# Patient Record
Sex: FEMALE | Race: WHITE | Employment: UNEMPLOYED | URBAN - METROPOLITAN AREA
[De-identification: names, ages, dates, MRNs, and addresses within clinical notes are randomized per-mention and may not be internally consistent; named-entity substitution may affect disease eponyms.]

---

## 2017-01-16 ENCOUNTER — OFFICE VISIT (OUTPATIENT)
Dept: INTERNAL MEDICINE CLINIC | Age: 27
End: 2017-01-16

## 2017-01-16 VITALS
RESPIRATION RATE: 19 BRPM | WEIGHT: 220.4 LBS | HEIGHT: 66 IN | TEMPERATURE: 97.7 F | DIASTOLIC BLOOD PRESSURE: 81 MMHG | HEART RATE: 75 BPM | SYSTOLIC BLOOD PRESSURE: 121 MMHG | BODY MASS INDEX: 35.42 KG/M2 | OXYGEN SATURATION: 97 %

## 2017-01-16 DIAGNOSIS — M25.552 PAIN OF LEFT HIP JOINT: ICD-10-CM

## 2017-01-16 DIAGNOSIS — Z00.00 WELL ADULT EXAM: Primary | ICD-10-CM

## 2017-01-16 DIAGNOSIS — J30.1 SEASONAL ALLERGIC RHINITIS DUE TO POLLEN: ICD-10-CM

## 2017-01-16 RX ORDER — CETIRIZINE HCL 10 MG
10 TABLET ORAL DAILY
COMMUNITY
End: 2017-07-21 | Stop reason: SDUPTHER

## 2017-01-16 NOTE — PATIENT INSTRUCTIONS
Hip Pain: Care Instructions  Your Care Instructions  Hip pain may be caused by many things, including overuse, a fall, or a twisting movement. Another cause of hip pain is arthritis. Your pain may increase when you stand up, walk, or squat. The pain may come and go or may be constant. Home treatment can help relieve hip pain, swelling, and stiffness. If your pain is ongoing, you may need more tests and treatment. Follow-up care is a key part of your treatment and safety. Be sure to make and go to all appointments, and call your doctor if you are having problems. Its also a good idea to know your test results and keep a list of the medicines you take. How can you care for yourself at home? · Take pain medicines exactly as directed. ¨ If the doctor gave you a prescription medicine for pain, take it as prescribed. ¨ If you are not taking a prescription pain medicine, ask your doctor if you can take an over-the-counter medicine. · Rest and protect your hip. Take a break from any activity, including standing or walking, that may cause pain. · Put ice or a cold pack against your hip for 10 to 20 minutes at a time. Try to do this every 1 to 2 hours for the next 3 days (when you are awake) or until the swelling goes down. Put a thin cloth between the ice and your skin. · Sleep on your healthy side with a pillow between your knees, or sleep on your back with pillows under your knees. · If there is no swelling, you can put moist heat, a heating pad, or a warm cloth on your hip. Do gentle stretching exercises to help keep your hip flexible. · Learn how to prevent falls. Have your vision and hearing checked regularly. Wear slippers or shoes with a nonskid sole. · Stay at a healthy weight. · Wear comfortable shoes. When should you call for help? Call 911 anytime you think you may need emergency care. For example, call if:  · You have sudden chest pain and shortness of breath, or you cough up blood.   · You are not able to stand or walk or bear weight. · Your buttocks, legs, or feet feel numb or tingly. · Your leg or foot is cool or pale or changes color. · You have severe pain. Call your doctor now or seek immediate medical care if:  · You have signs of infection, such as:  ¨ Increased pain, swelling, warmth, or redness in the hip area. ¨ Red streaks leading from the hip area. ¨ Pus draining from the hip area. ¨ A fever. · You have signs of a blood clot, such as:  ¨ Pain in your calf, back of the knee, thigh, or groin. ¨ Redness and swelling in your leg or groin. · You are not able to bend, straighten, or move your leg normally. · You have trouble urinating or having bowel movements. Watch closely for changes in your health, and be sure to contact your doctor if:  · You do not get better as expected. Where can you learn more? Go to http://abena-saul.info/. Enter D261 in the search box to learn more about \"Hip Pain: Care Instructions. \"  Current as of: May 27, 2016  Content Version: 11.1  © 1524-9332 [employer]. Care instructions adapted under license by [employer] (which disclaims liability or warranty for this information). If you have questions about a medical condition or this instruction, always ask your healthcare professional. [place] [address] any warranty or liability for your use of this information.

## 2017-01-16 NOTE — MR AVS SNAPSHOT
Visit Information     Date & Time Provider Department Dept. Phone Encounter #    1/16/2017  3:00 PM Thompson Hanna MD [address] [address] Internal Medicine [phone] [id number]      Follow-up Instructions     Return in about 6 months (around 7/16/2017) for Follow up. Upcoming Health Maintenance        Date Due    PAP AKA CERVICAL CYTOLOGY 10/26/2019    DTaP/Tdap/Td series (2 - Td) 9/9/2021      Allergies as of 1/16/2017  Review Complete On: 1/16/2017 By: Thompson Hanna MD    No Known Allergies      Current Immunizations  Never Reviewed    No immunizations on file. Not reviewed this visit   You Were Diagnosed With        Codes Comments    Well adult exam    -  Primary ICD-10-CM: Z00.00  ICD-9-CM: V70.0     Seasonal allergic rhinitis due to pollen     ICD-10-CM: J30.1  ICD-9-CM: 477.0     Pain of left hip joint     ICD-10-CM: M25.552  ICD-9-CM: 719.45       Vitals     BP Pulse Temp Resp Height(growth percentile) Weight(growth percentile)    121/81 (BP 1 Location: Right arm, BP Patient Position: Sitting) 75 97.7 °F (36.5 °C) (Oral) 19 5' 6\" (1.676 m) 220 lb 6.4 oz (100 kg)    LMP SpO2 BMI OB Status Smoking Status       12/27/2016 97% 35.57 kg/m2 Having regular periods Never Smoker     Vitals History      BMI and BSA Data     Body Mass Index Body Surface Area    35.57 kg/m 2 2.16 m 2         Preferred Pharmacy       Pharmacy Name Phone    [facility] [address] [place] [address], [facility]. Jimi Quijano  [phone]         Your Updated Medication List          This list is accurate as of: 1/16/17  4:01 PM.  Always use your most recent med list.                CLARITIN PO   Take  by mouth. [facility] (28) 0.25-35 mg-mcg Tab   Generic drug:  norgestimate-ethinyl estradiol   Take 1 Tab by mouth daily. ZyrTEC 10 mg tablet   Generic drug:  cetirizine   Take  by mouth. Follow-up Instructions     Return in about 6 months (around 7/16/2017) for Follow up. Patient Instructions         Hip Pain: Care Instructions  Your Care Instructions  Hip pain may be caused by many things, including overuse, a fall, or a twisting movement. Another cause of hip pain is arthritis. Your pain may increase when you stand up, walk, or squat. The pain may come and go or may be constant. Home treatment can help relieve hip pain, swelling, and stiffness. If your pain is ongoing, you may need more tests and treatment. Follow-up care is a key part of your treatment and safety. Be sure to make and go to all appointments, and call your doctor if you are having problems. Its also a good idea to know your test results and keep a list of the medicines you take. How can you care for yourself at home? · Take pain medicines exactly as directed. ¨ If the doctor gave you a prescription medicine for pain, take it as prescribed. ¨ If you are not taking a prescription pain medicine, ask your doctor if you can take an over-the-counter medicine. · Rest and protect your hip. Take a break from any activity, including standing or walking, that may cause pain. · Put ice or a cold pack against your hip for 10 to 20 minutes at a time. Try to do this every 1 to 2 hours for the next 3 days (when you are awake) or until the swelling goes down. Put a thin cloth between the ice and your skin. · Sleep on your healthy side with a pillow between your knees, or sleep on your back with pillows under your knees. · If there is no swelling, you can put moist heat, a heating pad, or a warm cloth on your hip. Do gentle stretching exercises to help keep your hip flexible. · Learn how to prevent falls. Have your vision and hearing checked regularly. Wear slippers or shoes with a nonskid sole. · Stay at a healthy weight. · Wear comfortable shoes. When should you call for help? Call 911 anytime you think you may need emergency care.  For example, call if:  · You have sudden chest pain and shortness of breath, or you cough up blood. · You are not able to stand or walk or bear weight. · Your buttocks, legs, or feet feel numb or tingly. · Your leg or foot is cool or pale or changes color. · You have severe pain. Call your doctor now or seek immediate medical care if:  · You have signs of infection, such as:  ¨ Increased pain, swelling, warmth, or redness in the hip area. ¨ Red streaks leading from the hip area. ¨ Pus draining from the hip area. ¨ A fever. · You have signs of a blood clot, such as:  ¨ Pain in your calf, back of the knee, thigh, or groin. ¨ Redness and swelling in your leg or groin. · You are not able to bend, straighten, or move your leg normally. · You have trouble urinating or having bowel movements. Watch closely for changes in your health, and be sure to contact your doctor if:  · You do not get better as expected. Where can you learn more? Go to http://abena-saul.info/. Enter Q632 in the search box to learn more about \"Hip Pain: Care Instructions. \"  Current as of: May 27, 2016  Content Version: 11.1  © 2933-7353 [employer]. Care instructions adapted under license by [employer] (which disclaims liability or warranty for this information). If you have questions about a medical condition or this instruction, always ask your healthcare professional. [place] [address] any warranty or liability for your use of this information. Introducing [facility] & HEALTH SERVICES! Consuelo Vásquez introduces [employer] patient portal. Now you can access parts of your medical record, email your doctor's office, and request medication refills online. 1. In your internet browser, go to [email or web address]   2. Click on the First Time User? Click Here link in the Sign In box. You will see the New Member Sign Up page. 3. Enter your [employer] Access Code exactly as it appears below.  You will not need to use this code after youve completed the sign-up process. If you do not sign up before the expiration date, you must request a new code. · viblast Access Code: [email or web address]  Expires: 4/16/2017  4:01 PM    4. Enter the last four digits of your Social Security Number (xxxx) and Date of Birth (mm/dd/yyyy) as indicated and click Submit. You will be taken to the next sign-up page. 5. Create a viblast ID. This will be your viblast login ID and cannot be changed, so think of one that is secure and easy to remember. 6. Create a viblast password. You can change your password at any time. 7. Enter your Password Reset Question and Answer. This can be used at a later time if you forget your password. 8. Enter your e-mail address. You will receive e-mail notification when new information is available in [facility] 9. Click Sign Up. You can now view and download portions of your medical record. 10. Click the Download Summary menu link to download a portable copy of your medical information. If you have questions, please visit the Frequently Asked Questions section of the viblast website. Remember, viblast is NOT to be used for urgent needs. For medical emergencies, dial 911. Now available from your iPhone and Android! Please provide this summary of care documentation to your next provider. Your primary care clinician is listed as Brianna Morse. If you have any questions after today's visit, please call [phone].

## 2017-01-16 NOTE — PROGRESS NOTES
HISTORY OF PRESENT ILLNESS  Truong Oakley is a 31 y.o. female. HPI   The pt is her for a complete physical.    She reports trying lose weight. Working out, eating better. She has seen the Gynecologist--she had a normal examination  She does report some hip pain--she has been to a masseuse and has been stretching. She notes this is trying to get better. No other complaints today. Patient Active Problem List    Diagnosis Date Noted    Seasonal allergic rhinitis due to pollen 10/27/2016     Current Outpatient Prescriptions   Medication Sig Dispense Refill    cetirizine (ZYRTEC) 10 mg tablet Take  by mouth.  norgestimate-ethinyl estradiol (SPRINTEC, 28,) 0.25-35 mg-mcg tab Take 1 Tab by mouth daily.  LORATADINE (CLARITIN PO) Take  by mouth. No Known Allergies  No past medical history on file. Past Surgical History   Procedure Laterality Date    Hx gyn       DNC     Family History   Problem Relation Age of Onset    Diabetes Mother     Heart Disease Paternal Grandmother      Social History   Substance Use Topics    Smoking status: Never Smoker    Smokeless tobacco: Not on file    Alcohol use 0.6 oz/week     1 Glasses of wine per week      Comment: occassional          Review of Systems   Constitutional: Negative. Gastrointestinal: Negative. Musculoskeletal: Positive for joint pain. Visit Vitals    /81 (BP 1 Location: Right arm, BP Patient Position: Sitting)    Pulse 75    Temp 97.7 °F (36.5 °C) (Oral)    Resp 19    Ht 5' 6\" (1.676 m)    Wt 220 lb 6.4 oz (100 kg)    LMP 12/27/2016    SpO2 97%    BMI 35.57 kg/m2       Physical Exam   Constitutional: She appears well-developed and well-nourished. HENT:   Head: Normocephalic and atraumatic. Mouth/Throat: Oropharynx is clear and moist.   Eyes: Pupils are equal, round, and reactive to light. Neck: Neck supple. Cardiovascular: Normal rate, regular rhythm and normal heart sounds.     No murmur heard.  Pulmonary/Chest: Effort normal and breath sounds normal. No respiratory distress. Musculoskeletal: Normal range of motion. She exhibits no tenderness. Lymphadenopathy:     She has no cervical adenopathy. ASSESSMENT and [facility] was seen today for complete physical.    Diagnoses and all orders for this visit:    Well adult exam    Seasonal allergic rhinitis due to pollen    Pain of left hip joint       Follow-up Disposition:  Return in about 6 months (around 7/16/2017) for Follow up.

## 2017-07-21 ENCOUNTER — OFFICE VISIT (OUTPATIENT)
Dept: INTERNAL MEDICINE CLINIC | Age: 27
End: 2017-07-21

## 2017-07-21 VITALS
HEART RATE: 66 BPM | OXYGEN SATURATION: 99 % | HEIGHT: 66 IN | BODY MASS INDEX: 34.55 KG/M2 | RESPIRATION RATE: 18 BRPM | TEMPERATURE: 98.2 F | DIASTOLIC BLOOD PRESSURE: 68 MMHG | SYSTOLIC BLOOD PRESSURE: 103 MMHG | WEIGHT: 215 LBS

## 2017-07-21 DIAGNOSIS — M25.511 RIGHT SHOULDER PAIN, UNSPECIFIED CHRONICITY: Primary | ICD-10-CM

## 2017-07-21 DIAGNOSIS — J30.1 SEASONAL ALLERGIC RHINITIS DUE TO POLLEN: ICD-10-CM

## 2017-07-21 RX ORDER — CETIRIZINE HCL 10 MG
10 TABLET ORAL DAILY
Qty: 30 TAB | Refills: 3 | Status: SHIPPED | OUTPATIENT
Start: 2017-07-21 | End: 2019-02-07

## 2017-07-21 NOTE — MR AVS SNAPSHOT
Visit Information     Date & Time Provider Department Dept. Phone Encounter #    7/21/2017  9:00 AM [facility] Internal Medicine [phone] [id number]      Follow-up Instructions     Return in about 6 months (around 1/21/2018) for Full Physical - 30 minutes appointment. Upcoming Health Maintenance        Date Due    INFLUENZA AGE 9 TO ADULT 8/1/2017    PAP AKA CERVICAL CYTOLOGY 10/26/2019    DTaP/Tdap/Td series (2 - Td) 9/9/2021      Allergies as of 7/21/2017  Review Complete On: 7/21/2017 By: Skye Perez MD    No Known Allergies      Current Immunizations  Never Reviewed    No immunizations on file. Not reviewed this visit   You Were Diagnosed With        Codes Comments    Right shoulder pain, unspecified chronicity    -  Primary ICD-10-CM: M25.511  ICD-9-CM: 719.41     Seasonal allergic rhinitis due to pollen     ICD-10-CM: J30.1  ICD-9-CM: 477.0       Vitals     BP Pulse Temp Resp Height(growth percentile) Weight(growth percentile)    103/68 (BP 1 Location: Left arm, BP Patient Position: Sitting) 66 98.2 °F (36.8 °C) (Oral) 18 5' 6\" (1.676 m) 215 lb (97.5 kg)    LMP SpO2 BMI OB Status Smoking Status       07/10/2017 (Approximate) 99% 34.7 kg/m2 Having regular periods Never Smoker     Vitals History      BMI and BSA Data     Body Mass Index Body Surface Area    34.7 kg/m 2 2.13 m 2         Preferred Pharmacy       Pharmacy Name Phone    [facility] [address] [place] [address], [facility]. Jimi Quijano  [phone]         Your Updated Medication List          This list is accurate as of: 7/21/17  9:23 AM.  Always use your most recent med list.                cetirizine 10 mg tablet   Commonly known as:  ZyrTEC   Take 1 Tab by mouth daily. [facility] (28) 0.25-35 mg-mcg Tab   Generic drug:  norgestimate-ethinyl estradiol   Take 1 Tab by mouth daily.                Prescriptions Sent to Pharmacy        Refills    cetirizine (ZYRTEC) 10 mg tablet 3 Sig: Take 1 Tab by mouth daily. Class: Normal    Pharmacy: [facility]  #: [phone]    Route: Oral      We Performed the Following     REFERRAL TO PHYSICAL THERAPY [QFO86 Custom]       Follow-up Instructions     Return in about 6 months (around 1/21/2018) for Full Physical - 30 minutes appointment. Referral Information     Referral ID Referred By Referred To       [id number] Miranda Haas V Not Available         Visits Status Start Date End Date    1 New Request 7/21/17 7/21/18    If your referral has a status of pending review or denied, additional information will be sent to support the outcome of this decision. Introducing [facility] & HEALTH SERVICES! Marii Zavala introduces [employer] patient portal. Now you can access parts of your medical record, email your doctor's office, and request medication refills online. 1. In your internet browser, go to [email or web address] Party   2. Click on the First Time User? Click Here link in the Sign In box. You will see the New Member Sign Up page. 3. Enter your [employer] Access Code exactly as it appears below. You will not need to use this code after youve completed the sign-up process. If you do not sign up before the expiration date, you must request a new code. · [employer] Access Code: [email or web address]  Expires: 10/19/2017  8:49 AM    4. Enter the last four digits of your Social Security Number (xxxx) and Date of Birth (mm/dd/yyyy) as indicated and click Submit. You will be taken to the next sign-up page. 5. Create a Euclidt ID. This will be your [employer] login ID and cannot be changed, so think of one that is secure and easy to remember. 6. Create a Euclidt password. You can change your password at any time. 7. Enter your Password Reset Question and Answer. This can be used at a later time if you forget your password. 8. Enter your e-mail address.  You will receive e-mail notification when new information is available in [address] 9. Click Sign Up. You can now view and download portions of your medical record. 10. Click the Download Summary menu link to download a portable copy of your medical information. If you have questions, please visit the Frequently Asked Questions section of the [employer] website. Remember, [employer] is NOT to be used for urgent needs. For medical emergencies, dial 911. Now available from your iPhone and Android! Please provide this summary of care documentation to your next provider. Your primary care clinician is listed as Gatito Drake. If you have any questions after today's visit, please call [phone].
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
## 2017-08-16 ENCOUNTER — HOSPITAL ENCOUNTER (OUTPATIENT)
Dept: PHYSICAL THERAPY | Age: 27
Discharge: HOME | End: 2017-08-16
Payer: COMMERCIAL

## 2017-08-16 PROCEDURE — 97110 THERAPEUTIC EXERCISES: CPT | Performed by: PHYSICAL THERAPIST

## 2017-08-16 PROCEDURE — 97162 PT EVAL MOD COMPLEX 30 MIN: CPT | Performed by: PHYSICAL THERAPIST

## 2017-08-16 PROCEDURE — 97140 MANUAL THERAPY 1/> REGIONS: CPT | Performed by: PHYSICAL THERAPIST

## 2017-08-16 PROCEDURE — 97014 ELECTRIC STIMULATION THERAPY: CPT | Performed by: PHYSICAL THERAPIST

## 2017-08-16 NOTE — PROGRESS NOTES
PT INITIAL EVALUATION NOTE 2-15    Patient Name: Memo Coleman  Date:2017  : 1990  [x]  Patient  Verified  Payor: Joanie Macdonald / Plan: Natalie Travis PPO / Product Type: PPO /    In time:3:35 PM  Out time:4:30 PM  Total Treatment Time (min): 55  Visit #: 1     Treatment Area: Pain in right shoulder [M25.511]    SUBJECTIVE  Pain Level (0-10 scale): 5/10   At worst: 7/10, pain is increased with reaching over head, working  At best: 2/10, pain is decreased with heat, massage  Any medication changes, allergies to medications, adverse drug reactions, diagnosis change, or new procedure performed?: [] No    [x] Yes (see summary sheet for update)  Subjective:     Pt c/o neck and shoulder pain for at least the past year. Pain has progressively worsened. Pt has been managing pain with massage. Pt reports massage helps with pain but she still has constant pain. Pt does cardiac ultrasounds and reports \"I am constantly in a bend awkward position. \"  Pt denies pain going down her arm. \"Sometimes there is a tingling in the middle of my back. \"  Pt denies numbness. Pt has not had any imaging. 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  90  Shoulder IR   T10  T7            Cervical AROM:  Flexion 100% \"pulling pain\"  Extension 100% \"stiffness\"  Side Bend 100% B p! Rotation R 75 deg L 80 deg       UPPER QUARTER   MUSCLE STRENGTH  KEY       R  L  0 - No Contraction  C1, C2 Neck Flex 5 p!    1 - Trace   C3 Side Flex  5  5    2 - Poor   C4 Sh Elev  5  5    3 - Fair    C5 Deltoid/Biceps 5  5    4 - Good   C6 Wrist Ext  5  5    5 - Normal   C7 Triceps  5  5        C8 Thumb Ext  5  5        T1 Hand Inst  5  5      MMT: See above  Shoulder flexion R 4/5 p!  L 5/5  Shoulder ER 4+/5 B  Shoulder IR 4/5 B  Neurological: Sensations: WNL     Special Tests:   Painful Arc: - B   Full Can Test R+ L-   Apley Scratch Test: R= L -   Esquivel: R+ L-    Modality rationale: decrease pain and increase tissue extensibility to improve the patients ability to sit, reach, lift, carry, perform ADLs   Min Type Additional Details   15 [x] Estim: []Att   [x]Unatt        []TENS instruct                  []IFC  [x]Premod   []NMES                     []Other:  []w/US   []w/ice   [x]w/heat  Position: supine  Location: c-spine    []  Traction: [] Cervical       []Lumbar                       [] Prone          []Supine                       []Intermittent   []Continuous Lbs:  [] before manual  [] after manual  []w/heat    []  Ultrasound: []Continuous   [] Pulsed at:                            []1MHz   []3MHz Location:  W/cm2:    []  Paraffin         Location:  []w/heat    []  Ice     []  Heat  []  Ice massage Position:  Location:    []  Laser  []  Other: Position:  Location:    []  Vasopneumatic Device Pressure:       [] lo [] med [] hi   Temperature:    [x] Skin assessment post-treatment:  [x]intact []redness- no adverse reaction    []redness - adverse reaction:     10 min Therapeutic Exercise:  [x] See flow sheet :   Rationale: increase ROM and increase strength to improve the patients ability to sit, stand, transfer, ambulate, lift, carry, reach, complete ADLs    10 min Manual Therapy:  MFR to B UT,  B levator, Cervical distraction with suboccipital release, R SCM stretch   Rationale: decrease pain, increase ROM, increase tissue extensibility and decrease trigger points  to improve the patients ability to sit, stand, transfer, ambulate, lift, carry, reach, complete ADLs         With   [x] TE   [] TA   [] neuro   [] other: Patient Education: [x] Review HEP    [] Progressed/Changed HEP based on:   [x] positioning   [x] body mechanics   [] transfers   [x] heat/ice application    [] other:      Pain Level (0-10 scale) post treatment: 0      ASSESSMENT:      [x]  See Plan of [facility], PT 8/16/2017  3:29 PM

## 2017-08-16 NOTE — PROGRESS NOTES
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 Assessment/ key information: The patient presents with progressively worsening R shoulder pain exacerbated by work tasks. The patient condition is complicated by postural dysfunction and chronic nature of condition. Evaluation Complexity History MEDIUM  Complexity : 1-2 comorbidities / personal factors will impact the outcome/ POC ; Examination MEDIUM Complexity : 3 Standardized tests and measures addressing body structure, function, activity limitation and / or participation in recreation  ;Presentation MEDIUM Complexity : Evolving with changing characteristics  ; Clinical Decision Making MEDIUM Complexity : FOTO score of 26-74  Overall Complexity Rating: MEDIUM    Problem List: pain affecting function, decrease ROM, decrease strength, decrease ADL/ functional abilitiies and decrease flexibility/ joint mobility   Treatment Plan may include any combination of the following: Therapeutic exercise, Neuromuscular re-education, Gait/balance training, Manual therapy, Patient education and Functional mobility training  Patient / Family readiness to learn indicated by: asking questions, trying to perform skills and interest  Persons(s) to be included in education: patient (P)  Barriers to Learning/Limitations: None  Patient Goal (s): decrease my pain  Patient Self Reported Health Status: good  Rehabilitation Potential: excellent    Short Term Goals: To be accomplished in 4 weeks:  1) The patient will be independent with introductory HEP  2) The patient will demonstrate pain free cervical rotation AROM WFL to improve safety with driving  3) The patient will demonstrate R shoulder extension to 50 degrees to improve ease with dressing  Long Term Goals: To be accomplished in 12 weeks:  1) The patient will demonstrate pain free shoulder AROM WFL to improve ease with household chores  2) The patient will demonstrate ability to complete a full day of work without an increase in pain  3) The patient will demonstrate ability to resume fitness routine without an increase in pain  Frequency / Duration: Patient to be seen 1 times per week for 12 weeks. Patient/ Caregiver education and instruction: self care, activity modification and exercises    [x]  Plan of care has been reviewed with CALVIN Basilio, PT 8/16/2017 3:29 PM    ________________________________________________________________________    I certify that the above Therapy Services are being furnished while the patient is under my care. I agree with the treatment plan and certify that this therapy is necessary.     [de-identified] Signature:____________________  Date:____________Time: _________

## 2017-08-22 ENCOUNTER — HOSPITAL ENCOUNTER (OUTPATIENT)
Dept: PHYSICAL THERAPY | Age: 27
Discharge: HOME | End: 2017-08-22
Payer: COMMERCIAL

## 2017-08-22 PROCEDURE — 97140 MANUAL THERAPY 1/> REGIONS: CPT

## 2017-08-22 PROCEDURE — 97014 ELECTRIC STIMULATION THERAPY: CPT

## 2017-08-22 PROCEDURE — 97110 THERAPEUTIC EXERCISES: CPT

## 2017-08-22 NOTE — PROGRESS NOTES
PT DAILY TREATMENT NOTE 2-15    Patient Name: Angelique Hernandez  Date:2017  : 1990  [x]  Patient  Verified  Payor: [employer] Payer / Plan: Goldy Almazan PPO / Product Type: PPO /    In time:4:20p  Out time:5:20p   Total Treatment Time (min): 60  Visit #: 2     Treatment Area: Pain in right shoulder [M25.511]    SUBJECTIVE  Pain Level (0-10 scale): 5  Any medication changes, allergies to medications, adverse drug reactions, diagnosis change, or new procedure performed?: [x] No    [] Yes (see summary sheet for update)  Subjective functional status/changes:   [] No changes reported  Patient reports she shoulder is very sore and achy today. Patient states she has been doing her HEP and went to the gym yesterday where she was doing planks and push ups and has been more sore.     OBJECTIVE    Modality rationale: decrease pain and increase tissue extensibility to improve the patients ability to lift, reach, carry and complete ADL's   Min Type Additional Details   15 [x] Estim: []Att   [x]Unatt        []TENS instruct                  []IFC  [x]Premod   []NMES                     []Other:  []w/US   []w/ice   [x]w/heat  Position:supine  Location:R shoulder, neck    []  Traction: [] Cervical       []Lumbar                       [] Prone          []Supine                       []Intermittent   []Continuous Lbs:  [] before manual  [] after manual  []w/heat    []  Ultrasound: []Continuous   [] Pulsed at:                            []1MHz   []3MHz Location:  W/cm2:    []  Paraffin         Location:  []w/heat    []  Ice     []  Heat  []  Ice massage Position:  Location:    []  Laser  []  Other: Position:  Location:    []  Vasopneumatic Device Pressure:       [] lo [] med [] hi   Temperature:    [x] Skin assessment post-treatment:  [x]intact []redness- no adverse reaction    []redness - adverse reaction:     30 min Therapeutic Exercise:  [x] See flow sheet :   Rationale: increase ROM and increase strength to improve the patients ability to  lift, reach, carry and complete ADL's    15 min Manual Therapy:  MFR R UT, levator, shoulder stabilizers, cervical paraspinals, suboccipital release   Rationale: decrease pain, increase ROM, increase tissue extensibility and decrease trigger points  to improve the patients ability to  lift, reach, carry and complete ADL's    With   [] TE   [] TA   [] neuro   [] other: Patient Education: [x] Review HEP    [] Progressed/Changed HEP based on:   [] positioning   [] body mechanics   [] transfers   [] heat/ice application    [] other:      Other Objective/Functional Measures: tissue turgor R UT, levator improved after manual     Pain Level (0-10 scale) post treatment: 0    ASSESSMENT/Changes in Function:     Patient will continue to benefit from skilled PT services to modify and progress therapeutic interventions, address functional mobility deficits, address ROM deficits, address strength deficits, analyze and address soft tissue restrictions, analyze and cue movement patterns, analyze and modify body mechanics/ergonomics and assess and modify postural abnormalities to attain remaining goals. []  See Plan of Care  []  See progress note/recertification  []  See Discharge Summary         Progress towards goals / Updated goals:  Patient able to advance several exercises with no increased pain. Patient educated on modified planks and push ups to prevent increased pain. Patient requires verbal cues for postural awareness and mechanics for exercises.      PLAN  [x]  Upgrade activities as tolerated     [x]  Continue plan of care  []  Update interventions per flow sheet       []  Discharge due to:_  []  Other:_      Helga Mensah PTA 8/22/2017  4:25 PM

## 2017-08-24 ENCOUNTER — OFFICE VISIT (OUTPATIENT)
Dept: INTERNAL MEDICINE CLINIC | Age: 27
End: 2017-08-24

## 2017-08-24 VITALS
SYSTOLIC BLOOD PRESSURE: 112 MMHG | BODY MASS INDEX: 35.93 KG/M2 | OXYGEN SATURATION: 99 % | HEART RATE: 69 BPM | WEIGHT: 223.6 LBS | RESPIRATION RATE: 16 BRPM | HEIGHT: 66 IN | TEMPERATURE: 97.9 F | DIASTOLIC BLOOD PRESSURE: 72 MMHG

## 2017-08-24 DIAGNOSIS — J20.9 ACUTE BRONCHITIS, UNSPECIFIED ORGANISM: ICD-10-CM

## 2017-08-24 DIAGNOSIS — J01.00 ACUTE NON-RECURRENT MAXILLARY SINUSITIS: Primary | ICD-10-CM

## 2017-08-24 DIAGNOSIS — J30.89 NON-SEASONAL ALLERGIC RHINITIS DUE TO OTHER ALLERGIC TRIGGER: ICD-10-CM

## 2017-08-24 RX ORDER — AMOXICILLIN AND CLAVULANATE POTASSIUM 875; 125 MG/1; MG/1
1 TABLET, FILM COATED ORAL 2 TIMES DAILY
Qty: 20 TAB | Refills: 0 | Status: SHIPPED | OUTPATIENT
Start: 2017-08-24 | End: 2017-09-03

## 2017-08-24 NOTE — PROGRESS NOTES
Chief Complaint   Patient presents with    Allergies     1. Have you been to the ER, urgent care clinic since your last visit? Hospitalized since your last visit? No    2. Have you seen or consulted any other health care providers outside of the [address] [place] [address] since your last visit? Include any pap smears or colon screening.  No

## 2017-08-24 NOTE — MR AVS SNAPSHOT
Visit Information     Date & Time Provider Department Dept. Phone Encounter #    8/24/2017  4:15 PM Enrico Opitz, [address] Internal Medicine [phone] [id number]      Follow-up Instructions     Return if symptoms worsen or fail to improve. Upcoming Health Maintenance        Date Due    INFLUENZA AGE 9 TO ADULT 8/1/2017    PAP AKA CERVICAL CYTOLOGY 10/26/2019    DTaP/Tdap/Td series (2 - Td) 9/9/2021      Allergies as of 8/24/2017  Review Complete On: 8/24/2017 By: Enrico Opitz, MD    No Known Allergies      Current Immunizations  Never Reviewed    No immunizations on file. Not reviewed this visit   You Were Diagnosed With        Codes Comments    Acute non-recurrent maxillary sinusitis    -  Primary ICD-10-CM: J01.00  ICD-9-CM: 461.0     Acute bronchitis, unspecified organism     ICD-10-CM: J20.9  ICD-9-CM: 466.0     Non-seasonal allergic rhinitis due to other allergic trigger     ICD-10-CM: J30.89  ICD-9-CM: 477.8       Vitals     BP Pulse Temp Resp Height(growth percentile) Weight(growth percentile)    112/72 69 97.9 °F (36.6 °C) (Oral) 16 5' 6\" (1.676 m) 223 lb 9.6 oz (101.4 kg)    LMP SpO2 BMI OB Status Smoking Status       08/10/2017 99% 36.09 kg/m2 Having regular periods Never Smoker       BMI and BSA Data     Body Mass Index Body Surface Area    36.09 kg/m 2 2.17 m 2         Preferred Pharmacy       Pharmacy Name Phone    [facility] [address] [place] [address], [facility]. Jimi Quijano [address] [phone]         Your Updated Medication List          This list is accurate as of: 8/24/17  4:50 PM.  Always use your most recent med list.                amoxicillin-clavulanate 875-125 mg per tablet   Commonly known as:  AUGMENTIN   Take 1 Tab by mouth two (2) times a day for 10 days. cetirizine 10 mg tablet   Commonly known as:  ZyrTEC   Take 1 Tab by mouth daily.        [facility] () 0.25-35 mg-mcg Tab   Generic drug:  norgestimate-ethinyl estradiol   Take 1 Tab by mouth daily.               Prescriptions Sent to Pharmacy        Refills    amoxicillin-clavulanate (AUGMENTIN) 875-125 mg per tablet 0    Sig: Take 1 Tab by mouth two (2) times a day for 10 days. Class: Normal    Pharmacy: [facility]  #: [phone]    Route: Oral      Follow-up Instructions     Return if symptoms worsen or fail to improve. To-Do List     08/29/2017 4:30 PM     Appointment with Danya Topete at [facility]  [address] ([phone])       09/05/2017 4:30 PM     Appointment with Danya Topete at [facility]  [address] ([phone])       09/11/2017 4:30 PM     Appointment with [address] [place] [address] at [facility]  [address] ([phone])       09/19/2017 4:30 PM     Appointment with Danya Topete at [facility]  [address] ([phone])         Introducing [facility] SERVICES! [employer] introduces [employer] patient portal. Now you can access parts of your medical record, email your doctor's office, and request medication refills online. 1. In your internet browser, go to [email or web address]   2. Click on the First Time User? Click Here link in the Sign In box. You will see the New Member Sign Up page. 3. Enter your [employer] Access Code exactly as it appears below. You will not need to use this code after youve completed the sign-up process. If you do not sign up before the expiration date, you must request a new code. · [employer] Access Code: [email or web address]  Expires: 10/19/2017  8:49 AM    4. Enter the last four digits of your Social Security Number (xxxx) and Date of Birth (mm/dd/yyyy) as indicated and click Submit. You will be taken to the next sign-up page. 5. Create a [employer] ID. This will be your [employer] login ID and cannot be changed, so think of one that is secure and easy to remember. 6. Create a [employer] password. You can change your password at any time. 7. Enter your Password Reset Question and Answer. This can be used at a later time if you forget your password. 8. Enter your e-mail address. You will receive e-mail notification when new information is available in [address] 9. Click Sign Up. You can now view and download portions of your medical record. 10. Click the Download Summary menu link to download a portable copy of your medical information. If you have questions, please visit the Frequently Asked Questions section of the [employer] website. Remember, [employer] is NOT to be used for urgent needs. For medical emergencies, dial 911. Now available from your iPhone and Android! Please provide this summary of care documentation to your next provider. Your primary care clinician is listed as Derik Larose. If you have any questions after today's visit, please call [phone].

## 2017-08-24 NOTE — PROGRESS NOTES
Subjective:   Carolyne Greenfield is a 28 y.o. female who complains of congestion, sneezing, sore throat, productive cough, cough described as productive of green sputum, bilateral sinus pain, bilateral ear fullness, popping and green nasal discharge for 7 days, gradually worsening since that time. Occasional wheezing. She denies a history of fevers, nausea, shortness of breath. Evaluation to date: none. Treatment to date: antihistamines, nasal steroids. Patient does not smoke cigarettes. Relevant PMH: No pertinent additional PMH. Current Outpatient Prescriptions   Medication Sig Dispense Refill    cetirizine (ZYRTEC) 10 mg tablet Take 1 Tab by mouth daily. 30 Tab 3    norgestimate-ethinyl estradiol (SPRINTEC, 28,) 0.25-35 mg-mcg tab Take 1 Tab by mouth daily. No Known Allergies     Review of Systems  Pertinent items are noted in HPI. Objective:     Visit Vitals    /72    Pulse 69    Temp 97.9 °F (36.6 °C) (Oral)    Resp 16    Ht 5' 6\" (1.676 m)    Wt 223 lb 9.6 oz (101.4 kg)    LMP 08/10/2017    SpO2 99%    BMI 36.09 kg/m2     General:  alert, cooperative, no distress   Eyes: negative   Ears: Left TM small amount of clear fluid, otherwise normal TM and external canal.    Sinuses: Nasal congestion, sinus nontender   Mouth:  normal findings: oropharynx pink & moist without lesions or evidence of thrush   Neck: supple, symmetrical, trachea midline and no adenopathy. Heart: S1 and S2 normal.    Lungs: clear to auscultation bilaterally   Abdomen: soft, non-tender. Bowel sounds normal. No masses,  no organomegaly        Assessment/Plan:     Diagnoses and all orders for this visit:    1. Acute non-recurrent maxillary sinusitis  2. Acute bronchitis, unspecified organism  3. Non-seasonal allergic rhinitis due to other allergic trigger  Plan:   Other orders  -     amoxicillin-clavulanate (AUGMENTIN) 875-125 mg per tablet; Take 1 Tab by mouth two (2) times a day for 10 days. Probiotic. Use additional birth control while on antibiotic. Continue with current allergy medications. .I have discussed the diagnosis with the patient and the intended plan as seen in the above orders. The patient has received an after-visit summary and questions were answered concerning future plans. I have discussed medication side effects and warnings with the patient as well. She has expressed understanding of the diagnosis and plan    Follow-up Disposition:  Return if symptoms worsen or fail to improve, for follow up.

## 2017-08-29 ENCOUNTER — HOSPITAL ENCOUNTER (OUTPATIENT)
Dept: PHYSICAL THERAPY | Age: 27
Discharge: HOME | End: 2017-08-29
Payer: COMMERCIAL

## 2017-08-29 PROCEDURE — 97110 THERAPEUTIC EXERCISES: CPT

## 2017-08-29 PROCEDURE — 97140 MANUAL THERAPY 1/> REGIONS: CPT

## 2017-08-29 PROCEDURE — 97014 ELECTRIC STIMULATION THERAPY: CPT

## 2017-08-29 NOTE — PROGRESS NOTES
PT DAILY TREATMENT NOTE 2-15    Patient Name: Bhupinder Perales  Date:2017  : 1990  [x]  Patient  Verified  Payor: Genet Jiménezing / Plan: Dominic Mcmahan PPO / Product Type: PPO /    In time:4:30p  Out time: 5:30p   Total Treatment Time (min): 60  Visit #: 3     Treatment Area: Pain in right shoulder [M25.511]    SUBJECTIVE  Pain Level (0-10 scale): 5  Any medication changes, allergies to medications, adverse drug reactions, diagnosis change, or new procedure performed?: [x] No    [] Yes (see summary sheet for update)  Subjective functional status/changes:   [] No changes reported  Patient reports she was feeling good until this weekend when she was extremely busy at work.     OBJECTIVE    Modality rationale: decrease pain and increase tissue extensibility to improve the patients ability to lift, reach, carry and complete ADL's   Min Type Additional Details   15 [x] Estim: []Att   [x]Unatt        []TENS instruct                  []IFC  [x]Premod   []NMES                     []Other:  []w/US   []w/ice   [x]w/heat  Position:supine  Location:R shoulder, neck    []  Traction: [] Cervical       []Lumbar                       [] Prone          []Supine                       []Intermittent   []Continuous Lbs:  [] before manual  [] after manual  []w/heat    []  Ultrasound: []Continuous   [] Pulsed at:                            []1MHz   []3MHz Location:  W/cm2:    []  Paraffin         Location:  []w/heat    []  Ice     []  Heat  []  Ice massage Position:  Location:    []  Laser  []  Other: Position:  Location:    []  Vasopneumatic Device Pressure:       [] lo [] med [] hi   Temperature:    [x] Skin assessment post-treatment:  [x]intact []redness- no adverse reaction    []redness - adverse reaction:     30 min Therapeutic Exercise:  [x] See flow sheet :   Rationale: increase ROM and increase strength to improve the patients ability to  lift, reach, carry and complete ADL's    15 min Manual Therapy:  MFR R UT, levator, shoulder stabilizers, cervical paraspinals, suboccipital release   Rationale: decrease pain, increase ROM, increase tissue extensibility and decrease trigger points  to improve the patients ability to  lift, reach, carry and complete ADL's    With   [] TE   [] TA   [] neuro   [] other: Patient Education: [x] Review HEP    [] Progressed/Changed HEP based on:   [] positioning   [] body mechanics   [] transfers   [] heat/ice application    [] other:      Other Objective/Functional Measures:      Pain Level (0-10 scale) post treatment: 0    ASSESSMENT/Changes in Function:     Patient will continue to benefit from skilled PT services to modify and progress therapeutic interventions, address functional mobility deficits, address ROM deficits, address strength deficits, analyze and address soft tissue restrictions, analyze and cue movement patterns, analyze and modify body mechanics/ergonomics and assess and modify postural abnormalities to attain remaining goals. []  See Plan of Care  []  See progress note/recertification  []  See Discharge Summary         Progress towards goals / Updated goals:  Patient able to tolerate all exercises and advance several with no increased pain and reports increased fatigue.      PLAN  [x]  Upgrade activities as tolerated     [x]  Continue plan of care  []  Update interventions per flow sheet       []  Discharge due to:_  []  Other:_      Filiberto Marquez PTA 8/29/2017  4:25 PM

## 2017-09-05 ENCOUNTER — HOSPITAL ENCOUNTER (OUTPATIENT)
Dept: PHYSICAL THERAPY | Age: 27
Discharge: HOME | End: 2017-09-05
Payer: COMMERCIAL

## 2017-09-05 PROCEDURE — 97014 ELECTRIC STIMULATION THERAPY: CPT

## 2017-09-05 PROCEDURE — 97140 MANUAL THERAPY 1/> REGIONS: CPT

## 2017-09-05 PROCEDURE — 97110 THERAPEUTIC EXERCISES: CPT

## 2017-09-05 NOTE — PROGRESS NOTES
PT DAILY TREATMENT NOTE 2-15    Patient Name: Angelique Hernandez  Date:2017  : 1990  [x]  Patient  Verified  Payor: [employer] Payer / Plan: Goldy Almazan PPO / Product Type: PPO /    In time:4:30p  Out time: 5:30p  Total Treatment Time (min): 60  Visit #: 4     Treatment Area: Pain in right shoulder [M25.511]    SUBJECTIVE  Pain Level (0-10 scale): 6-7  Any medication changes, allergies to medications, adverse drug reactions, diagnosis change, or new procedure performed?: [x] No    [] Yes (see summary sheet for update)  Subjective functional status/changes:   [] No changes reported  Patient reports she has been getting some numbness in her first three fingers with doorway stretch.     OBJECTIVE    Modality rationale: decrease pain and increase tissue extensibility to improve the patients ability to lift, reach, carry and complete ADL's   Min Type Additional Details   15 [x] Estim: []Att   [x]Unatt        []TENS instruct                  []IFC  [x]Premod   []NMES                     []Other:  []w/US   []w/ice   [x]w/heat  Position:supine  Location:R shoulder, neck    []  Traction: [] Cervical       []Lumbar                       [] Prone          []Supine                       []Intermittent   []Continuous Lbs:  [] before manual  [] after manual  []w/heat    []  Ultrasound: []Continuous   [] Pulsed at:                            []1MHz   []3MHz Location:  W/cm2:    []  Paraffin         Location:  []w/heat    []  Ice     []  Heat  []  Ice massage Position:  Location:    []  Laser  []  Other: Position:  Location:    []  Vasopneumatic Device Pressure:       [] lo [] med [] hi   Temperature:    [x] Skin assessment post-treatment:  [x]intact []redness- no adverse reaction    []redness - adverse reaction:     30 min Therapeutic Exercise:  [x] See flow sheet :   Rationale: increase ROM and increase strength to improve the patients ability to  lift, reach, carry and complete ADL's    15 min Manual Therapy:  SEE BULL, levator, shoulder stabilizers, cervical paraspinals, suboccipital release   Rationale: decrease pain, increase ROM, increase tissue extensibility and decrease trigger points  to improve the patients ability to  lift, reach, carry and complete ADL's    With   [] TE   [] TA   [] neuro   [] other: Patient Education: [x] Review HEP    [] Progressed/Changed HEP based on:   [] positioning   [] body mechanics   [] transfers   [] heat/ice application    [] other:      Other Objective/Functional Measures:      Pain Level (0-10 scale) post treatment: 0    ASSESSMENT/Changes in Function:     Patient will continue to benefit from skilled PT services to modify and progress therapeutic interventions, address functional mobility deficits, address ROM deficits, address strength deficits, analyze and address soft tissue restrictions, analyze and cue movement patterns, analyze and modify body mechanics/ergonomics and assess and modify postural abnormalities to attain remaining goals. []  See Plan of Care  []  See progress note/recertification  []  See Discharge Summary         Progress towards goals / Updated goals:  Patient able to tolerate all exercises and advance several with no increased pain. Patient requires verbal cues for postural awareness and mechanics and is making steady progress towards goals.     PLAN  [x]  Upgrade activities as tolerated     [x]  Continue plan of care  []  Update interventions per flow sheet       []  Discharge due to:_  []  Other:_      [facility] 9/5/2017  4:25 PM

## 2017-09-11 ENCOUNTER — HOSPITAL ENCOUNTER (OUTPATIENT)
Dept: PHYSICAL THERAPY | Age: 27
Discharge: HOME | End: 2017-09-11
Payer: COMMERCIAL

## 2017-09-11 PROCEDURE — 97014 ELECTRIC STIMULATION THERAPY: CPT | Performed by: PHYSICAL THERAPIST

## 2017-09-11 PROCEDURE — 97110 THERAPEUTIC EXERCISES: CPT | Performed by: PHYSICAL THERAPIST

## 2017-09-11 PROCEDURE — 97140 MANUAL THERAPY 1/> REGIONS: CPT | Performed by: PHYSICAL THERAPIST

## 2017-09-11 NOTE — PROGRESS NOTES
PT DAILY TREATMENT NOTE 2-15    Patient Name: Hilary Murillo  Date:2017  : 1990  [x]  Patient  Verified  Payor: Danni Aquino / Plan: Noemi Thomas PPO / Product Type: PPO /    In time:4:30p  Out time: 5:30p  Total Treatment Time (min): 60  Visit #: 5    Treatment Area: Pain in right shoulder [M25.511]    SUBJECTIVE  Pain Level (0-10 scale): 4-5 L shoulder  Any medication changes, allergies to medications, adverse drug reactions, diagnosis change, or new procedure performed?: [x] No    [] Yes (see summary sheet for update)  Subjective functional status/changes:   [] No changes reported  Patient reports she doesn't know why but her L shoulder is hurting today    OBJECTIVE    Modality rationale: decrease pain and increase tissue extensibility to improve the patients ability to lift, reach, carry and complete ADL's   Min Type Additional Details   15 [x] Estim: []Att   [x]Unatt        []TENS instruct                  []IFC  [x]Premod   []NMES                     []Other:  []w/US   []w/ice   [x]w/heat  Position:supine  Location:R shoulder, neck    []  Traction: [] Cervical       []Lumbar                       [] Prone          []Supine                       []Intermittent   []Continuous Lbs:  [] before manual  [] after manual  []w/heat    []  Ultrasound: []Continuous   [] Pulsed at:                            []1MHz   []3MHz Location:  W/cm2:    []  Paraffin         Location:  []w/heat    []  Ice     []  Heat  []  Ice massage Position:  Location:    []  Laser  []  Other: Position:  Location:    []  Vasopneumatic Device Pressure:       [] lo [] med [] hi   Temperature:    [x] Skin assessment post-treatment:  [x]intact []redness- no adverse reaction    []redness - adverse reaction:     30 min Therapeutic Exercise:  [x] See flow sheet :   Rationale: increase ROM and increase strength to improve the patients ability to  lift, reach, carry and complete ADL's    15 min Manual Therapy:  MFR R UT, levator, shoulder stabilizers, cervical paraspinals, suboccipital release   Rationale: decrease pain, increase ROM, increase tissue extensibility and decrease trigger points  to improve the patients ability to  lift, reach, carry and complete ADL's    With   [x] TE   [] TA   [] neuro   [] other: Patient Education: [x] Review HEP    [] Progressed/Changed HEP based on:   [x] positioning   [x] body mechanics   [] transfers   [x] heat/ice application    [] other:      Other Objective/Functional Measures:    No pain with therapeutic exercise this visit     Pain Level (0-10 scale) post treatment: 0    ASSESSMENT/Changes in Function:     Patient will continue to benefit from skilled PT services to modify and progress therapeutic interventions, address functional mobility deficits, address ROM deficits, address strength deficits, analyze and address soft tissue restrictions, analyze and cue movement patterns, analyze and modify body mechanics/ergonomics and assess and modify postural abnormalities to attain remaining goals. []  See Plan of Care  []  See progress note/recertification  []  See Discharge Summary         Progress towards goals / Updated goals:  Patient able to tolerate all exercises and advance several with no increased pain. Patient requires verbal cues for postural awareness and mechanics and is making steady progress towards goals.     PLAN  [x]  Upgrade activities as tolerated     [x]  Continue plan of care  []  Update interventions per flow sheet       []  Discharge due to:_  []  Other:_      Anders Childs PT, DPT 9/11/2017  4:30 PM

## 2017-09-19 ENCOUNTER — APPOINTMENT (OUTPATIENT)
Dept: PHYSICAL THERAPY | Age: 27
End: 2017-09-19
Payer: COMMERCIAL

## 2017-09-20 ENCOUNTER — HOSPITAL ENCOUNTER (OUTPATIENT)
Dept: PHYSICAL THERAPY | Age: 27
Discharge: HOME | End: 2017-09-20
Payer: COMMERCIAL

## 2017-09-20 PROCEDURE — 97140 MANUAL THERAPY 1/> REGIONS: CPT | Performed by: PHYSICAL THERAPIST

## 2017-09-20 PROCEDURE — 97110 THERAPEUTIC EXERCISES: CPT | Performed by: PHYSICAL THERAPIST

## 2017-09-20 PROCEDURE — 97530 THERAPEUTIC ACTIVITIES: CPT | Performed by: PHYSICAL THERAPIST

## 2017-09-20 NOTE — PROGRESS NOTES
PT DAILY TREATMENT NOTE 2-15    Patient Name: Tha Cruz  Date:2017  : 1990  [x]  Patient  Verified  Payor: Denise Anderson / Plan: Elizabet Wen PPO / Product Type: PPO /    In time:5:00  Out time:600  Total Treatment Time (min): 60  Visit #: 6     Treatment Area: Pain in right shoulder [M25.511]    SUBJECTIVE  Pain Level (0-10 scale): 6/10  Any medication changes, allergies to medications, adverse drug reactions, diagnosis change, or new procedure performed?: [x] No    [] Yes (see summary sheet for update)  Subjective functional status/changes:   [] No changes reported  Patient reports that she has not been good with her exercises this week and she can tell the difference because her shoulder really hurts    OBJECTIVE        30 min Therapeutic Exercise:  [x] See flow sheet :   Rationale: increase ROM and increase strength to improve the patients ability to tolerate sitting and holding devices needed for her job     15 min Therapeutic Activity:  [x]  See flow sheet :   Rationale: increase ROM, increase strength and increase proprioception  to improve the patients ability to perform all lift/carry/reach ADL's         15 min Manual Therapy:  Sub occipital release, Upper trap MFR and TPR with manual stretch and SCS to inhibit overactivity.   Cervical upslides and downglides to improve ROM   Rationale: decrease pain, increase ROM and increase tissue extensibility  to improve the patients ability to hold ultrasound at work without pain      With   [x] TE   [x] TA   [] neuro   [] other: Patient Education: [x] Review HEP    [x] Progressed/Changed HEP based on:   [] positioning   [x] body mechanics   [] transfers   [] heat/ice application    [] other:      Other Objective/Functional Measures: Patient performed PRE's with improved form with mirror feedback     Pain Level (0-10 scale) post treatment: 0/3    ASSESSMENT/Changes in Function:     Patient tolerated manual treatment well and demonstrates improved active cervical and GH ROM with less upper trap substitution. Patient will continue to benefit from skilled PT services to modify and progress therapeutic interventions, address functional mobility deficits, address ROM deficits, address strength deficits, analyze and cue movement patterns and analyze and modify body mechanics/ergonomics to attain remaining goals.      []  See Plan of Care  []  See progress note/recertification  []  See Discharge Summary             PLAN  [x]  Upgrade activities as tolerated     [x]  Continue plan of care  []  Update interventions per flow sheet       []  Discharge due to:_  []  Other:_      Mj Ring, PT, DPT 9/20/2017  5:01 PM

## 2017-11-01 NOTE — PROGRESS NOTES
[address] [place] [address], [address]  Phone: [phone]  Fax: [phone]    Discharge Summary  2-15    Patient name: Ginette Biggs  : 1990  Provider#: [id number]  Referral source: Mirlande Sewell MD      Medical/Treatment Diagnosis: Pain in right shoulder [M25.511]     Prior Hospitalization: see medical history     Comorbidities: See Plan of Care  Prior Level of Function:See Plan of Care  Medications: Verified on Patient Summary List    Start of Care: 17      Onset Date: 1 year ago   Visits from Start of Care: 6     Missed Visits: 0  Reporting Period : 17 to 17      ASSESSMENT/SUMMARY OF CARE: The patient has not been seen since 17.       RECOMMENDATIONS:  [x]Discontinue therapy: []Patient has reached or is progressing toward set goals      [x]Patient is non-compliant or has abdicated      []Due to lack of appreciable progress towards set goals      []Other    Gm Rodriguez, PT 2017 3:04 PM

## 2017-12-11 ENCOUNTER — HOSPITAL ENCOUNTER (EMERGENCY)
Age: 27
Discharge: HOME | End: 2017-12-11
Attending: FAMILY MEDICINE

## 2017-12-11 VITALS
HEIGHT: 66 IN | DIASTOLIC BLOOD PRESSURE: 81 MMHG | SYSTOLIC BLOOD PRESSURE: 136 MMHG | HEART RATE: 69 BPM | WEIGHT: 230 LBS | BODY MASS INDEX: 36.96 KG/M2 | OXYGEN SATURATION: 98 % | RESPIRATION RATE: 20 BRPM | TEMPERATURE: 98.1 F

## 2017-12-11 DIAGNOSIS — J01.00 ACUTE NON-RECURRENT MAXILLARY SINUSITIS: Primary | ICD-10-CM

## 2017-12-11 DIAGNOSIS — H92.02 EAR PAIN, LEFT: ICD-10-CM

## 2017-12-11 RX ORDER — DEXAMETHASONE SODIUM PHOSPHATE 100 MG/10ML
10 INJECTION INTRAMUSCULAR; INTRAVENOUS
Status: COMPLETED | OUTPATIENT
Start: 2017-12-11 | End: 2017-12-11

## 2017-12-11 RX ORDER — AMOXICILLIN 875 MG/1
875 TABLET, FILM COATED ORAL 2 TIMES DAILY
Qty: 20 TAB | Refills: 0 | Status: SHIPPED | OUTPATIENT
Start: 2017-12-11 | End: 2017-12-21

## 2017-12-11 RX ADMIN — DEXAMETHASONE SODIUM PHOSPHATE 10 MG: 100 INJECTION INTRAMUSCULAR; INTRAVENOUS at 13:00

## 2017-12-11 NOTE — UC PROVIDER NOTE
Patient is a 28 y.o. female presenting with ear pain. The history is provided by the patient (left ear fullness for about 2 weeks and then sinus pressure and congestion for about 3 days. thick green discharge). Ear Pain    This is a new problem. The problem occurs constantly. The problem has not changed since onset. Patient complains that the left ear is affected. There has been no fever. The pain is at a severity of 0/10. The patient is experiencing no pain. Pertinent negatives include no ear discharge, no rhinorrhea, no sore throat, no abdominal pain, no neck pain, no cough and no rash. Associated symptoms comments: Some sinus pain and congestion for about 3 days and making her left ear congestion worse. History reviewed. No pertinent past medical history. Past Surgical History:   Procedure Laterality Date    HX GYN      DNC         Family History   Problem Relation Age of Onset    Diabetes Mother     Heart Disease Paternal Grandmother         Social History     Social History    Marital status: UNKNOWN     Spouse name: N/A    Number of children: N/A    Years of education: N/A     Occupational History    Not on file. Social History Main Topics    Smoking status: Never Smoker    Smokeless tobacco: Never Used    Alcohol use 0.6 oz/week     1 Glasses of wine per week      Comment: occassional    Drug use: No    Sexual activity: Yes     Partners: Male     Birth control/ protection: Pill     Other Topics Concern    Not on file     Social History Narrative                ALLERGIES: Review of patient's allergies indicates no known allergies. Review of Systems   Constitutional: Negative. HENT: Negative for ear discharge, ear pain (left ear fullness like water in the ear), rhinorrhea and sore throat. Respiratory: Negative. Negative for cough. Gastrointestinal: Negative for abdominal pain. Musculoskeletal: Negative for neck pain. Skin: Negative for rash.    Neurological: Negative. Vitals:    12/11/17 1251   BP: 136/81   Pulse: 69   Resp: 20   Temp: 98.1 °F (36.7 °C)   SpO2: 98%   Weight: 104.3 kg (230 lb)   Height: 5' 6\" (1.676 m)       Physical Exam   Constitutional: She is oriented to person, place, and time. HENT:   Head: Normocephalic and atraumatic. Mouth/Throat: Oropharynx is clear and moist.   Nasal congestion, left maxillary sinus pain, PND, BL fluid behind the TM's without erythema, left TM slightly dull and retracted with cone of light interrupted     Eyes: Conjunctivae are normal.   Neck: Normal range of motion. Neck supple. Cardiovascular: Normal rate, regular rhythm and normal heart sounds. Pulmonary/Chest: Effort normal and breath sounds normal. No respiratory distress. She has no wheezes. She has no rales. She exhibits no tenderness. Lymphadenopathy:     She has no cervical adenopathy. Neurological: She is alert and oriented to person, place, and time. Skin: Skin is warm and dry. Psychiatric: She has a normal mood and affect. Her behavior is normal. Thought content normal.   Nursing note and vitals reviewed. MDM     Differential Diagnosis; Clinical Impression; Plan:     CLINICAL IMPRESSION:  Acute non-recurrent maxillary sinusitis  (primary encounter diagnosis)  Ear pain, left    Plan:  1. amox  2. Decadron x 1 and topical nasal steroids  3. Supportive care  Pregnancy test offered and pt declined    Risk of Significant Complications, Morbidity, and/or Mortality:   Presenting problems: Moderate  Management options:   Moderate  Progress:   Patient progress:  Stable      Procedures

## 2017-12-11 NOTE — DISCHARGE INSTRUCTIONS
Sinusitis: Care Instructions  Your Care Instructions    Sinusitis is an infection of the lining of the sinus cavities in your head. Sinusitis often follows a cold. It causes pain and pressure in your head and face. In most cases, sinusitis gets better on its own in 1 to 2 weeks. But some mild symptoms may last for several weeks. Sometimes antibiotics are needed. Follow-up care is a key part of your treatment and safety. Be sure to make and go to all appointments, and call your doctor if you are having problems. It's also a good idea to know your test results and keep a list of the medicines you take. How can you care for yourself at home? · Take an over-the-counter pain medicine, such as acetaminophen (Tylenol), ibuprofen (Advil, Motrin), or naproxen (Aleve). Read and follow all instructions on the label. · If the doctor prescribed antibiotics, take them as directed. Do not stop taking them just because you feel better. You need to take the full course of antibiotics. · Be careful when taking over-the-counter cold or flu medicines and Tylenol at the same time. Many of these medicines have acetaminophen, which is Tylenol. Read the labels to make sure that you are not taking more than the recommended dose. Too much acetaminophen (Tylenol) can be harmful. · Breathe warm, moist air from a steamy shower, a hot bath, or a sink filled with hot water. Avoid cold, dry air. Using a humidifier in your home may help. Follow the directions for cleaning the machine. · Use saline (saltwater) nasal washes to help keep your nasal passages open and wash out mucus and bacteria. You can buy saline nose drops at a grocery store or drugstore. Or you can make your own at home by adding 1 teaspoon of salt and 1 teaspoon of baking soda to 2 cups of distilled water. If you make your own, fill a bulb syringe with the solution, insert the tip into your nostril, and squeeze gently. Luis Morenoa your nose.   · Put a hot, wet towel or a warm gel pack on your face 3 or 4 times a day for 5 to 10 minutes each time. · Try a decongestant nasal spray like oxymetazoline (Afrin). Do not use it for more than 3 days in a row. Using it for more than 3 days can make your congestion worse. When should you call for help? Call your doctor now or seek immediate medical care if:  ? · You have new or worse swelling or redness in your face or around your eyes. ? · You have a new or higher fever. ? Watch closely for changes in your health, and be sure to contact your doctor if:  ? · You have new or worse facial pain. ? · The mucus from your nose becomes thicker (like pus) or has new blood in it. ? · You are not getting better as expected. Where can you learn more? Go to http://abena-saul.info/. Enter E107 in the search box to learn more about \"Sinusitis: Care Instructions. \"  Current as of: May 12, 2017  Content Version: 11.4  © 6847-4062 [employer]. Care instructions adapted under license by [employer] (which disclaims liability or warranty for this information). If you have questions about a medical condition or this instruction, always ask your healthcare professional. [address] any warranty or liability for your use of this information. Earache: Care Instructions  Your Care Instructions    Even though infection is a common cause of ear pain, not all ear pain means an infection. If you have ear pain and don't have an infection, it could be because of a jaw problem, such as temporomandibular joint (TMJ) pain. Or it could be because of a neck problem. When ear discomfort or pain is mild or comes and goes without other symptoms, home treatment may be all you need. Follow-up care is a key part of your treatment and safety. Be sure to make and go to all appointments, and call your doctor if you are having problems.  It's also a good idea to know your test results and keep a list of the medicines you take. How can you care for yourself at home? · Apply heat on the ear to ease pain. To apply heat, put a warm water bottle, a heating pad set on low, or a warm cloth on your ear. Do not go to sleep with a heating pad on your skin. · Take an over-the-counter pain medicine, such as acetaminophen (Tylenol), ibuprofen (Advil, Motrin), or naproxen (Aleve). Be safe with medicines. Read and follow all instructions on the label. · Do not take two or more pain medicines at the same time unless the doctor told you to. Many pain medicines have acetaminophen, which is Tylenol. Too much acetaminophen (Tylenol) can be harmful. · Never insert anything, such as a cotton swab or a kevin pin, into the ear. When should you call for help? Call your doctor now or seek immediate medical care if:  ? · You have new or worse symptoms of infection, such as:  ¨ Increased pain, swelling, warmth, or redness. ¨ Red streaks leading from the area. ¨ Pus draining from the area. ¨ A fever. ? Watch closely for changes in your health, and be sure to contact your doctor if:  ? · You have new or worse discharge coming from the ear. ? · You do not get better as expected. Where can you learn more? Go to http://abena-saul.info/. Enter B432 in the search box to learn more about \"Earache: Care Instructions. \"  Current as of: May 12, 2017  Content Version: 11.4  © 3570-6711 [employer]. Care instructions adapted under license by [employer] (which disclaims liability or warranty for this information). If you have questions about a medical condition or this instruction, always ask your healthcare professional. [address] any warranty or liability for your use of this information.

## 2018-01-26 ENCOUNTER — HOSPITAL ENCOUNTER (OUTPATIENT)
Dept: CT IMAGING | Age: 28
Discharge: HOME | End: 2018-01-26
Attending: OTOLARYNGOLOGY
Payer: COMMERCIAL

## 2018-01-26 DIAGNOSIS — H93.A2 PULSATILE TINNITUS OF LEFT EAR: ICD-10-CM

## 2018-01-26 PROCEDURE — 70480 CT ORBIT/EAR/FOSSA W/O DYE: CPT

## 2018-02-09 ENCOUNTER — HOSPITAL ENCOUNTER (OUTPATIENT)
Dept: MRI IMAGING | Age: 28
Discharge: HOME | End: 2018-02-09
Attending: OTOLARYNGOLOGY
Payer: COMMERCIAL

## 2018-02-09 DIAGNOSIS — H93.A2 PULSATILE TINNITUS OF LEFT EAR: ICD-10-CM

## 2018-02-09 PROCEDURE — 70544 MR ANGIOGRAPHY HEAD W/O DYE: CPT

## 2018-02-12 ENCOUNTER — HOSPITAL ENCOUNTER (OUTPATIENT)
Dept: LAB | Age: 28
Discharge: HOME | End: 2018-02-12

## 2018-02-12 ENCOUNTER — HOSPITAL ENCOUNTER (EMERGENCY)
Age: 28
Discharge: HOME | End: 2018-02-12
Attending: FAMILY MEDICINE

## 2018-02-12 VITALS
DIASTOLIC BLOOD PRESSURE: 84 MMHG | RESPIRATION RATE: 20 BRPM | SYSTOLIC BLOOD PRESSURE: 120 MMHG | BODY MASS INDEX: 36.96 KG/M2 | HEIGHT: 66 IN | HEART RATE: 78 BPM | OXYGEN SATURATION: 98 % | WEIGHT: 230 LBS | TEMPERATURE: 97.6 F

## 2018-02-12 DIAGNOSIS — J03.90 ACUTE TONSILLITIS, UNSPECIFIED ETIOLOGY: Primary | ICD-10-CM

## 2018-02-12 LAB — S PYO AG THROAT QL: NEGATIVE

## 2018-02-12 PROCEDURE — 87070 CULTURE OTHR SPECIMN AEROBIC: CPT | Performed by: FAMILY MEDICINE

## 2018-02-12 RX ORDER — PENICILLIN V POTASSIUM 500 MG/1
500 TABLET, FILM COATED ORAL 4 TIMES DAILY
Qty: 40 TAB | Refills: 0 | Status: SHIPPED | OUTPATIENT
Start: 2018-02-12 | End: 2018-02-22

## 2018-02-12 NOTE — UC PROVIDER NOTE
Patient is a 28 y.o. female presenting with sore throat. The history is provided by the patient. Sore Throat    This is a new problem. The current episode started 2 days ago. The problem has been rapidly worsening. There has been no fever. Associated symptoms include swollen glands and trouble swallowing. She has had exposure to strep. She has tried nothing for the symptoms. History reviewed. No pertinent past medical history. Past Surgical History:   Procedure Laterality Date    HX GYN      DNC         Family History   Problem Relation Age of Onset    Diabetes Mother     Heart Disease Paternal Grandmother         Social History     Social History    Marital status: SINGLE     Spouse name: N/A    Number of children: N/A    Years of education: N/A     Occupational History    Not on file. Social History Main Topics    Smoking status: Never Smoker    Smokeless tobacco: Never Used    Alcohol use 0.6 oz/week     1 Glasses of wine per week      Comment: occassional    Drug use: No    Sexual activity: Yes     Partners: Male     Birth control/ protection: Pill     Other Topics Concern    Not on file     Social History Narrative                ALLERGIES: Review of patient's allergies indicates no known allergies. Review of Systems   HENT: Positive for sore throat and trouble swallowing. All other systems reviewed and are negative. Vitals:    02/12/18 0824 02/12/18 0830   BP:  120/84   Pulse:  78   Resp:  20   Temp:  97.6 °F (36.4 °C)   SpO2:  98%   Weight: 104.3 kg (230 lb)    Height: 5' 6\" (1.676 m)        Physical Exam   Constitutional: No distress. HENT:   Right Ear: Tympanic membrane and ear canal normal.   Left Ear: Tympanic membrane and ear canal normal.   Nose: Nose normal.   Mouth/Throat: Uvula swelling present. Posterior oropharyngeal erythema present. No oropharyngeal exudate, posterior oropharyngeal edema or tonsillar abscesses (enlarged left tonsils with exudate). Eyes: Conjunctivae are normal. Right eye exhibits no discharge. Left eye exhibits no discharge. Neck: Neck supple. Pulmonary/Chest: Effort normal and breath sounds normal. No respiratory distress. She has no decreased breath sounds. She has no wheezes. She has no rhonchi. She has no rales. Lymphadenopathy:     She has no cervical adenopathy. Skin: No rash noted. Nursing note and vitals reviewed. MDM     Differential Diagnosis; Clinical Impression; Plan:     CLINICAL IMPRESSION:  Acute tonsillitis, unspecified etiology  (primary encounter diagnosis)      DDX    Plan:    Rapid strep- negative  Start penicillin- motrin/ gargles  Amount and/or Complexity of Data Reviewed:   Clinical lab tests:  Ordered and reviewed   Review and summarize past medical records:  Yes  Risk of Significant Complications, Morbidity, and/or Mortality:   Presenting problems: Moderate  Diagnostic procedures: Moderate  Management options:   Moderate  Progress:   Patient progress:  Stable      Procedures

## 2018-02-12 NOTE — DISCHARGE INSTRUCTIONS
Tonsillitis: Care Instructions  Your Care Instructions    Tonsillitis is an infection of the tonsils that is caused by bacteria or a virus. The tonsils are in the back of the throat and are part of the immune system. Tonsillitis typically lasts from a few days up to a couple of weeks. Tonsillitis caused by a virus goes away on its own. Tonsillitis caused by the bacteria that causes strep throat is treated with antibiotics. You and your doctor may consider surgery to remove the tonsils (tonsillectomy) if you have serious complications or repeat infections. Follow-up care is a key part of your treatment and safety. Be sure to make and go to all appointments, and call your doctor if you are having problems. It's also a good idea to know your test results and keep a list of the medicines you take. How can you care for yourself at home? · If your doctor prescribed antibiotics, take them as directed. Do not stop taking them just because you feel better. You need to take the full course of antibiotics. · Gargle with warm salt water. This helps reduce swelling and relieve discomfort. Gargle once an hour with 1 teaspoon of salt mixed in 8 fluid ounces of warm water. · Take an over-the-counter pain medicine, such as acetaminophen (Tylenol), ibuprofen (Advil, Motrin), or naproxen (Aleve). Be safe with medicines. Read and follow all instructions on the label. No one younger than 20 should take aspirin. It has been linked to Reye syndrome, a serious illness. · Be careful when taking over-the-counter cold or flu medicines and Tylenol at the same time. Many of these medicines have acetaminophen, which is Tylenol. Read the labels to make sure that you are not taking more than the recommended dose. Too much acetaminophen (Tylenol) can be harmful. · Try an over-the-counter throat spray to relieve throat pain. · Drink plenty of fluids. Fluids may help soothe an irritated throat.  Drink warm or cool liquids (whichever feels better). These include tea, soup, and juice. · Do not smoke, and avoid secondhand smoke. Smoking can make tonsillitis worse. If you need help quitting, talk to your doctor about stop-smoking programs and medicines. These can increase your chances of quitting for good. · Use a vaporizer or humidifier to add moisture to your bedroom. Follow the directions for cleaning the machine. When should you call for help? Call your doctor now or seek immediate medical care if:  ? · Your pain gets worse on one side of your throat. ? · You have a new or higher fever. ? · You notice changes in your voice. ? · You have trouble opening your mouth. ? · You have any trouble breathing. ? · You have much more trouble swallowing. ? · You have a fever with a stiff neck or a severe headache. ? · You are sensitive to light or feel very sleepy or confused. ? Watch closely for changes in your health, and be sure to contact your doctor if:  ? · You do not get better after 2 days. Where can you learn more? Go to http://abena-saul.info/. Enter Z632 in the search box to learn more about \"Tonsillitis: Care Instructions. \"  Current as of: May 12, 2017  Content Version: 11.4  © 8971-2051 Healthwise, Incorporated. Care instructions adapted under license by [employer] (which disclaims liability or warranty for this information). If you have questions about a medical condition or this instruction, always ask your healthcare professional. [place] [address] any warranty or liability for your use of this information.

## 2018-02-12 NOTE — LETTER
[address]  [phone]    Work/School Note    Date: 2/12/2018    To Whom It May concern:    Alejandra was seen and treated today in the urgent care center by the following provider(s):  Attending Provider: Elizabeth Fong MD.      Alejandra may return to work on 2/13/18.     Sincerely,          Elizabeth Fong MD

## 2018-02-14 LAB
BACTERIA SPEC CULT: NORMAL
SERVICE CMNT-IMP: NORMAL

## 2018-12-07 ENCOUNTER — OFFICE VISIT (OUTPATIENT)
Dept: NEUROSURGERY | Age: 28
End: 2018-12-07

## 2018-12-07 ENCOUNTER — HOSPITAL ENCOUNTER (OUTPATIENT)
Dept: CT IMAGING | Age: 28
Discharge: HOME | End: 2018-12-07
Attending: RADIOLOGY
Payer: COMMERCIAL

## 2018-12-07 VITALS
HEIGHT: 66 IN | SYSTOLIC BLOOD PRESSURE: 128 MMHG | DIASTOLIC BLOOD PRESSURE: 90 MMHG | HEART RATE: 92 BPM | BODY MASS INDEX: 39.7 KG/M2 | RESPIRATION RATE: 17 BRPM | WEIGHT: 247 LBS | OXYGEN SATURATION: 99 % | TEMPERATURE: 98.1 F

## 2018-12-07 DIAGNOSIS — R51.9 PERSISTENT HEADACHES: ICD-10-CM

## 2018-12-07 DIAGNOSIS — H93.A2 PULSATILE TINNITUS OF LEFT EAR: Primary | ICD-10-CM

## 2018-12-07 DIAGNOSIS — H93.A2 PULSATILE TINNITUS OF LEFT EAR: ICD-10-CM

## 2018-12-07 PROBLEM — E66.01 SEVERE OBESITY (HCC): Status: ACTIVE | Noted: 2018-12-07

## 2018-12-07 PROCEDURE — 74011636320 HC RX REV CODE- 636/320: Performed by: RADIOLOGY

## 2018-12-07 PROCEDURE — 70496 CT ANGIOGRAPHY HEAD: CPT

## 2018-12-07 PROCEDURE — 74011000258 HC RX REV CODE- 258: Performed by: RADIOLOGY

## 2018-12-07 RX ORDER — SODIUM CHLORIDE 0.9 % (FLUSH) 0.9 %
10 SYRINGE (ML) INJECTION
Status: COMPLETED | OUTPATIENT
Start: 2018-12-07 | End: 2018-12-07

## 2018-12-07 RX ADMIN — SODIUM CHLORIDE 100 ML: 900 INJECTION, SOLUTION INTRAVENOUS at 17:11

## 2018-12-07 RX ADMIN — IOPAMIDOL 100 ML: 755 INJECTION, SOLUTION INTRAVENOUS at 17:11

## 2018-12-07 RX ADMIN — Medication 10 ML: at 17:11

## 2018-12-07 NOTE — PROGRESS NOTES
Neurointerventional Surgery Pre-Op Ambulatory Progress Note      Patient: Briana Martinez MRN: [id number]  SSN: [id number]    YOB: 1990  Age: 33 y.o. Sex: female      History of Present Illness:      Ms. Niki Burks is a 33year old female with no significant past medical history except chronic headaches. She presents as a referral from Dr. Flory Torres office after having a pulsatile noise in her left ear. She states that it started approximately one year ago but has gotten progressively worse over the last four months. Now it is present most of the time and is exacerbated by bending over, tilting her head to the left and hard compression of her right jugular vein. She also states that with lying down she can occasionally hear a higher pitched \"shrill\" noise. Relieving factors are a light compression of the left jugular vein ( one finger) will make the pulsing go away. Patient Active Problem List   Diagnosis Code    Seasonal allergic rhinitis due to pollen J30.1    Pulsatile tinnitus of left ear H93. A2    Persistent headaches R51    Severe obesity (HCC) E66.01     Current Outpatient Medications   Medication Sig Dispense Refill    norgestimate-ethinyl estradiol (SPRINTEC, 28,) 0.25-35 mg-mcg tab Take 1 Tab by mouth daily.  cetirizine (ZYRTEC) 10 mg tablet Take 1 Tab by mouth daily. 30 Tab 3     No Known Allergies  Past Medical History:   Diagnosis Date    Headache      Past Surgical History:   Procedure Laterality Date    HX GYN      DNC     Family History   Problem Relation Age of Onset    Diabetes Mother     Heart Disease Paternal Grandmother     Cancer Other      Social History     Tobacco Use    Smoking status: Never Smoker    Smokeless tobacco: Never Used   Substance Use Topics    Alcohol use:  Yes     Alcohol/week: 0.6 oz     Types: 1 Glasses of wine per week     Frequency: Never     Comment: occassional        Review of Systems    The Review of systems is negative except that which is noted in HPI above. Objective:       Visit Vitals  /90 (BP 1 Location: Left arm)   Pulse 92   Temp 98.1 °F (36.7 °C)   Resp 17   Ht 5' 6\" (1.676 m)   Wt 112 kg (247 lb)   SpO2 99%   BMI 39.87 kg/m²       Current Outpatient Medications   Medication Sig    norgestimate-ethinyl estradiol (SPRINTEC, 28,) 0.25-35 mg-mcg tab Take 1 Tab by mouth daily.  cetirizine (ZYRTEC) 10 mg tablet Take 1 Tab by mouth daily. No current facility-administered medications for this visit. Physical Exam:  General: NAD  HEENT: Amplified electronic auscultation reveals faint and low frequency pulsatile noise behind left ear with moderate compression of right jugular vein. Eyes: negative  Lungs: clear to auscultation bilaterally  Heart: regular rate and rhythm, S1, S2 normal, no murmur, click, rub or gallop  Extremities: extremities normal, atraumatic, no cyanosis or edema  Pulses: 2+ and symmetric    Neurologic Exam:  Mental Status:  Alert and oriented x 4. Appropriate affect, mood and behavior. Language:    Normal fluency, repetition, comprehension and naming. Cranial Nerves:   Pupils equal, round and reactive to light. Visual fields full to confrontation. Extraocular movements intact. Facial sensation intact V1 - V3. Full facial strength, no asymmetry. Hearing intact bilaterally. No dysarthria. Tongue protrudes to midline, palate elevates symmetrically. Shoulder shrug 5/5 bilaterally. Motor:    No pronator drift. Bulk and tone normal.      5/5 power in all extremities proximally and distally. No involuntary movements. Sensation:    Sensation intact throughout to light touch. Coordination & Gait: Normal, Normal, tandem gait normal, FTN and HTS intact.         Labs:  Lab Results   Component Value Date/Time    Sodium 143 01/11/2017 07:55 AM    Potassium 4.8 01/11/2017 07:55 AM    Chloride 104 01/11/2017 07:55 AM    CO2 22 01/11/2017 07:55 AM Glucose 88 01/11/2017 07:55 AM    BUN 14 01/11/2017 07:55 AM    Creatinine 0.70 01/11/2017 07:55 AM    BUN/Creatinine ratio 20 01/11/2017 07:55 AM    GFR est  01/11/2017 07:55 AM    GFR est non- 01/11/2017 07:55 AM    Calcium 9.3 01/11/2017 07:55 AM     Lab Results   Component Value Date/Time    WBC 5.5 01/11/2017 07:55 AM    HGB 11.9 01/11/2017 07:55 AM    HCT 37.0 01/11/2017 07:55 AM    PLATELET 293 46/78/4255 07:55 AM    MCV 88 01/11/2017 07:55 AM     Lab Results   Component Value Date/Time    MCH 28.3 01/11/2017 07:55 AM    MCHC 32.2 01/11/2017 07:55 AM    BASOPHILS 1 01/11/2017 07:55 AM    ABS. MONOCYTES 0.3 01/11/2017 07:55 AM    ABS. EOSINOPHILS 0.3 01/11/2017 07:55 AM    ABS. BASOPHILS 0.0 01/11/2017 07:55 AM       Imaging :           Assessment/Plan:       ICD-10-CM ICD-9-CM    1. Pulsatile tinnitus of left ear H93. A2 388.30 CTA HEAD   2. Persistent headaches R51 784.0 CTA HEAD        This is a 33year old female with no past medical history except persistent headaches. 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 Among the differentials are venous thrombosis. CTA/V has been ordered as a next step to confirm diagnosis. She will also need an ophthalmology appointment to have a full eye exam including examination of the optic disc to rule out pseudotumor cerebri. Instructed patient to start taking 325 mg aspirin daily along with stopping/holding birth control until we have worked up possible venous thrombosis. She will follow up with Dr. Azucena Nascimento once these tests have completed to discuss next steps. - patient given opportunity to review images and ask questions , with information provided.       Pretty Velásquez NP

## 2018-12-07 NOTE — PATIENT INSTRUCTIONS
Today we discussed your left sided pulsatile tinnitus. Your MRV shows possible narrowing of the left transverse dural venous sinus. CTA/V is needed to confirm. Please schedule an appointment with the ophthalmologist of your choice for a dilate fundoscopic examination and send the results to our office. We are looking for papilledema (swelling of the optic nerve). This would indicate pseudotumor cerebri also known as idiopathic intracranial hypertension. Take 325 mg of aspirin every day until your next office visit. Venous thrombosis is a differential consideration. Please consider holding/stopping your birth control pill until the workup is completed. Oral contraceptives increase the risk of venous clots. You will need to use an alternative contraceptive to avoid pregnancy. I will see you back in the office to discuss the CTA/V and next steps. Learning About Stan Larios  What is a living will? A living will is a legal form you use to write down the kind of care you want at the end of your life. It is used by the health professionals who will treat you if you aren't able to decide for yourself. If you put your wishes in writing, your loved ones and others will know what kind of care you want. They won't need to guess. This can ease your mind and be helpful to others. A living will is not the same as an estate or property will. An estate will explains what you want to happen with your money and property after you die. Is a living will a legal document? A living will is a legal document. Each state has its own laws about living [place]. If you move to another state, make sure that your living will is legal in the state where you now live. Or you might use a universal form that has been approved by many states. This kind of form can sometimes be completed and stored online. Your electronic copy will then be available wherever you have a connection to the Internet.  In most cases, doctors will respect your wishes even if you have a form from a different state. · You don't need an  to complete a living will. But legal advice can be helpful if your state's laws are unclear, your health history is complicated, or your family can't agree on what should be in your living will. · You can change your living will at any time. Some people find that their wishes about end-of-life care change as their health changes. · In addition to making a living will, think about completing a medical power of  form. This form lets you name the person you want to make end-of-life treatment decisions for you (your \"health care agent\") if you're not able to. Many hospitals and nursing homes will give you the forms you need to complete a living will and a medical power of . · Your living will is used only if you can't make or communicate decisions for yourself anymore. If you become able to make decisions again, you can accept or refuse any treatment, no matter what you wrote in your living will. · Your state may offer an online registry. This is a place where you can store your living will online so the doctors and nurses who need to treat you can find it right away. What should you think about when creating a living will? Talk about your end-of-life wishes with your family members and your doctor. Let them know what you want. That way the people making decisions for you won't be surprised by your choices. Think about these questions as you make your living will:  · Do you know enough about life support methods that might be used? If not, talk to your doctor so you know what might be done if you can't breathe on your own, your heart stops, or you're unable to swallow. · What things would you still want to be able to do after you receive life-support methods? Would you want to be able to walk? To speak? To eat on your own? To live without the help of machines?   · If you have a choice, where do you want to be cared for? In your home? At a hospital or nursing home? · Do you want certain Mormonism practices performed if you become very ill? · If you have a choice at the end of your life, where would you prefer to die? At home? In a hospital or nursing home? Somewhere else? · Would you prefer to be buried or cremated? · Do you want your organs to be donated after you die? What should you do with your living will? · Make sure that your family members and your health care agent have copies of your living will. · Give your doctor a copy of your living will to keep in your medical record. If you have more than one doctor, make sure that each one has a copy. · You may want to put a copy of your living will where it can be easily found. Where can you learn more? Go to http://abena-saul.info/. Enter X743 in the search box to learn more about \"Learning About Living [place]. \"  Current as of: April 19, 2018  Content Version: 11.8  © 6742-2938 Healthwise, Incorporated. Care instructions adapted under license by [employer] (which disclaims liability or warranty for this information). If you have questions about a medical condition or this instruction, always ask your healthcare professional. [address] any warranty or liability for your use of this information.

## 2018-12-07 NOTE — PROGRESS NOTES
New patient referred by Dr Maria Del Carmen Ramirez presenting with hearing a heartbeat and shrill like sound in left ear for past year. Sometimes sound is worse. Can become worse at night or when she is reading. Reports frequent headaches and discomfort in left ear. Sometime will press on left ear to stop the sounds. last scans done February 2018.

## 2018-12-07 NOTE — PROGRESS NOTES
I reviewed the recent MRV/MRA imaging. Unfortunately, both studies were performed without contrast.  The nondominant left transverse sinus appears severely narrowed at two separate sites proximally and distally. There is also at least mild stenosis of the right transverse sigmoid junction. I suspect left transverse sinus stenosis is the etiology of her pulsatile tinnitus especially given augmentation with light right neck manual compression and the low frequency, faint sound on amplified auscultation. I have personally seen and examined the patient. I have personally reviewed the chart and images. Elements of my examination included history of present illness, review of systems, review of past medical and surgical history, review of medications, and physical and neurological examination. I have personally reviewed the findings and impressions with Betty Bautista NP and   I am in agreement with her assessment and plan.     Thank you for the referral of this pleasant patient.   Prem Rodriguez MD, PhD    [facility]

## 2018-12-21 ENCOUNTER — OFFICE VISIT (OUTPATIENT)
Dept: NEUROSURGERY | Age: 28
End: 2018-12-21

## 2018-12-21 VITALS
HEART RATE: 72 BPM | OXYGEN SATURATION: 97 % | RESPIRATION RATE: 18 BRPM | DIASTOLIC BLOOD PRESSURE: 90 MMHG | SYSTOLIC BLOOD PRESSURE: 132 MMHG | WEIGHT: 247 LBS | BODY MASS INDEX: 39.7 KG/M2 | HEIGHT: 66 IN

## 2018-12-21 DIAGNOSIS — H93.A2 PULSATILE TINNITUS OF LEFT EAR: Primary | ICD-10-CM

## 2018-12-21 NOTE — PATIENT INSTRUCTIONS
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 You are being referred to Dr. Emily Flores (skull base ENT surgeon) for definitive workup. AV Fistula is much less likely given this structural cause for left sided pulsatile tinnitus. The CTA also shows narrowing of both transverse dural venous sinuses, a finding that has been described in the setting of pseudotumor cerebri (intracranial hypertension). Your recent eye exam at [facility] shows no papilledema. At this point, conservative measures (weight loss with a goal of ideal body weight) is recommended. If your headaches persist, neurology consultation and lumbar puncture may be indicated. No neurointerventional followup is recommended unless your symptoms worsen. Thank you for choosing our practice at [address] [place] [address]. Learning About Stan Gonzalez  What is a living will? A living will is a legal form you use to write down the kind of care you want at the end of your life. It is used by the health professionals who will treat you if you aren't able to decide for yourself. If you put your wishes in writing, your loved ones and others will know what kind of care you want. They won't need to guess. This can ease your mind and be helpful to others. A living will is not the same as an estate or property will. An estate will explains what you want to happen with your money and property after you die. Is a living will a legal document? A living will is a legal document. Each state has its own laws about living rincon. If you move to another state, make sure that your living will is legal in the state where you now live. Or you might use a universal form that has been approved by many states. This kind of form can sometimes be completed and stored online. Your electronic copy will then be available wherever you have a connection to the Internet.  In most cases, doctors will respect your wishes even if you have a form from a different state. · You don't need an  to complete a living will. But legal advice can be helpful if your state's laws are unclear, your health history is complicated, or your family can't agree on what should be in your living will. · You can change your living will at any time. Some people find that their wishes about end-of-life care change as their health changes. · In addition to making a living will, think about completing a medical power of  form. This form lets you name the person you want to make end-of-life treatment decisions for you (your \"health care agent\") if you're not able to. Many hospitals and nursing homes will give you the forms you need to complete a living will and a medical power of . · Your living will is used only if you can't make or communicate decisions for yourself anymore. If you become able to make decisions again, you can accept or refuse any treatment, no matter what you wrote in your living will. · Your state may offer an online registry. This is a place where you can store your living will online so the doctors and nurses who need to treat you can find it right away. What should you think about when creating a living will? Talk about your end-of-life wishes with your family members and your doctor. Let them know what you want. That way the people making decisions for you won't be surprised by your choices. Think about these questions as you make your living will:  · Do you know enough about life support methods that might be used? If not, talk to your doctor so you know what might be done if you can't breathe on your own, your heart stops, or you're unable to swallow. · What things would you still want to be able to do after you receive life-support methods? Would you want to be able to walk? To speak? To eat on your own? To live without the help of machines? · If you have a choice, where do you want to be cared for? In your home? At a hospital or nursing home? · Do you want certain Synagogue practices performed if you become very ill? · If you have a choice at the end of your life, where would you prefer to die? At home? In a hospital or nursing home? Somewhere else? · Would you prefer to be buried or cremated? · Do you want your organs to be donated after you die? What should you do with your living will? · Make sure that your family members and your health care agent have copies of your living will. · Give your doctor a copy of your living will to keep in your medical record. If you have more than one doctor, make sure that each one has a copy. · You may want to put a copy of your living will where it can be easily found. Where can you learn more? Go to http://abena-saul.info/. Enter N674 in the search box to learn more about \"Learning About Living [place]. \"  Current as of: April 19, 2018  Content Version: 11.8  © 9666-3825 Healthwise, Incorporated. Care instructions adapted under license by [employer] (which disclaims liability or warranty for this information). If you have questions about a medical condition or this instruction, always ask your healthcare professional. [address] any warranty or liability for your use of this information.

## 2018-12-21 NOTE — PROGRESS NOTES
Neurointerventional Surgery  Ambulatory Progress Note (Established Patient)      Patient: Nicolasa Falk MRN: [id number]  SSN: [id number]    YOB: 1990  Age: 33 y.o. Sex: female      History of Present Illness:      Lori Aguilar presents today for clinical followup and review of her recent CTA imaging performed for headaches and left sided pulsatile tinnitus. She continues to report intermittent pulsatile tinnitus that is more prominent during valsalva, bending over, and lying down. Her chronic headaches are also unchanged. Since her last visit, she underwent a dilated fundoscopic exam at [facility]. Those records are discussed below. She does not endorse vision changes or focal neurological symptoms since her initial visit. Patient Active Problem List   Diagnosis Code    Seasonal allergic rhinitis due to pollen J30.1    Pulsatile tinnitus of left ear H93. A2    Persistent headaches R51    Severe obesity (HCC) E66.01        Review of Systems    Per HPI. Objective:     Current Outpatient Medications   Medication Sig Dispense Refill    cetirizine (ZYRTEC) 10 mg tablet Take 1 Tab by mouth daily. 30 Tab 3    norgestimate-ethinyl estradiol (SPRINTEC, 28,) 0.25-35 mg-mcg tab Take 1 Tab by mouth daily. Visit Vitals  /90 (BP 1 Location: Right arm)   Pulse 72   Resp 18   Ht 5' 6\" (1.676 m)   Wt 247 lb (112 kg)   SpO2 97%   BMI 39.87 kg/m²       No Known Allergies    Current Outpatient Medications   Medication Sig    cetirizine (ZYRTEC) 10 mg tablet Take 1 Tab by mouth daily.  norgestimate-ethinyl estradiol (SPRINTEC, 28,) 0.25-35 mg-mcg tab Take 1 Tab by mouth daily. No current facility-administered medications for this visit. Physical Exam:  General: NAD  HEENT:  No audible objective tinnitus with amplified auscultation. No carotid or vertebral arterial bruits in the neck.    Lungs: clear to auscultation bilaterally  Heart: regular rate and rhythm, S1, S2 normal, no murmur, click, rub or gallop  Extremities: extremities normal, atraumatic, no cyanosis or edema      Neurologic Exam:  Mental Status:  Alert and oriented x 4. Appropriate affect, mood and behavior. Language:    Normal fluency, repetition, comprehension and naming. Cranial Nerves:   Pupils equal, round and reactive to light. Visual fields full to confrontation. Extraocular movements intact. Facial sensation intact V1 - V3. Full facial strength, no asymmetry. Hearing intact bilaterally. No dysarthria. Tongue protrudes to midline, palate elevates symmetrically. Shoulder shrug 5/5 bilaterally. Motor:    No pronator drift. Bulk and tone normal.      5/5 power in all extremities proximally and distally. No involuntary movements. Sensation:    Sensation intact throughout to light touch. Coordination & Gait: Normal, tandem gait normal, FTN and HTS intact. Labs:  Lab Results   Component Value Date/Time    Sodium 143 01/11/2017 07:55 AM    Potassium 4.8 01/11/2017 07:55 AM    Chloride 104 01/11/2017 07:55 AM    CO2 22 01/11/2017 07:55 AM    Glucose 88 01/11/2017 07:55 AM    BUN 14 01/11/2017 07:55 AM    Creatinine 0.70 01/11/2017 07:55 AM    BUN/Creatinine ratio 20 01/11/2017 07:55 AM    GFR est  01/11/2017 07:55 AM    GFR est non- 01/11/2017 07:55 AM    Calcium 9.3 01/11/2017 07:55 AM     Lab Results   Component Value Date/Time    WBC 5.5 01/11/2017 07:55 AM    HGB 11.9 01/11/2017 07:55 AM    HCT 37.0 01/11/2017 07:55 AM    PLATELET 108 34/79/8486 07:55 AM    MCV 88 01/11/2017 07:55 AM     Lab Results   Component Value Date/Time    MCH 28.3 01/11/2017 07:55 AM    MCHC 32.2 01/11/2017 07:55 AM    BASOPHILS 1 01/11/2017 07:55 AM    ABS. MONOCYTES 0.3 01/11/2017 07:55 AM    ABS. EOSINOPHILS 0.3 01/11/2017 07:55 AM    ABS.  BASOPHILS 0.0 01/11/2017 07:55 AM       IMAGING      I reveiwed the CTA obtained on 12/10/208. This demonstrates significant narrowing of both distal transverse sinuses. I otherwise agree with the radiology report issued on the study below. Cta Head    Result Date: 12/10/2018  IMPRESSION: 1. 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 2. 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 3. Patent dural venous sinuses without evidence of significant stenosis. 4. Unremarkable intracranial arterial vasculature. Assessment/Plan:       ICD-10-CM ICD-9-CM    1. Pulsatile tinnitus of left ear H93. 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 After a discussion with Dr. Violette Woodson, the referring ENT, I will refer her to Dr. Megan Tapia, an expert in skull base procedures for further evaluation. She also has asymptomatic right sided dehiscence that is actually more impressive. There is an overlay of possible early onset pseudotumor cerebri (intracranial hypertension). Her risk factors are morbid obesity and bilateral transverse sinus stenoses visible on CTA. There is no venous sinus thrombosis. Results from a dilated fundoscopic examination (Dr. Jason Macdonald at [facility]), were reviewed. This demonstrated no papilledema. The left transverse venous sinus stenosis could be secondary to pseudotumor cerebri or a contributing factor. The left stenosis is worse than the right side. The left sided stenosis could also be the cause of pulsatile tinnitus. If the patient continues to experience chronic headaches, I would recommend neurology consultation and lumbar puncture for opening pressure measurements.   The absence of papilledema indicates that if present, intracranial hypertension is not posing any immediate threat to vision. Her examination remains consistent with a low pressure venous etiology (sigmoid dehiscence or transverse sinus stenosis). At this point, I do not think cerebral angiography is indicated. My suspicion for intracranial AV fistula is currently low. Intracranial stenting of the venous stenosis would only be considered in the clinical setting of medically refractory pseudotumor cerebri (with threat of vision loss). Today we discussed the patient's weight as a possible contributing factor to the current symptoms. She is informed and considering various weight loss strategies. No neurointerventional followup is indicated at this time. I am available if symptoms worsen or if medically refractory pseudotumor develops. Follow-up Disposition:    I have discussed the diagnosis with the patient and the intended plan as seen in the above orders. Patient is in agreement. The patient has received an after-visit summary and questions were answered concerning future plans. I have discussed medication side effects and warnings with the patient as well.       Christa Gasca MD

## 2018-12-21 NOTE — PROGRESS NOTES
Follow up for scan results. Patient reports no change in sounds in left ear or headaches. No acute problems reported.

## 2019-02-19 ENCOUNTER — HOSPITAL ENCOUNTER (OUTPATIENT)
Age: 29
Setting detail: OUTPATIENT SURGERY
Discharge: HOME | End: 2019-02-19
Attending: SPECIALIST | Admitting: SPECIALIST
Payer: COMMERCIAL

## 2019-02-19 ENCOUNTER — ANESTHESIA (OUTPATIENT)
Dept: SURGERY | Age: 29
End: 2019-02-19
Payer: COMMERCIAL

## 2019-02-19 ENCOUNTER — ANESTHESIA EVENT (OUTPATIENT)
Dept: SURGERY | Age: 29
End: 2019-02-19
Payer: COMMERCIAL

## 2019-02-19 VITALS
RESPIRATION RATE: 21 BRPM | WEIGHT: 240 LBS | HEIGHT: 66 IN | TEMPERATURE: 98 F | HEART RATE: 86 BPM | DIASTOLIC BLOOD PRESSURE: 81 MMHG | BODY MASS INDEX: 38.57 KG/M2 | SYSTOLIC BLOOD PRESSURE: 127 MMHG | OXYGEN SATURATION: 97 %

## 2019-02-19 DIAGNOSIS — L76.82 PAIN AT SURGICAL INCISION: Primary | ICD-10-CM

## 2019-02-19 LAB — HCG UR QL: NEGATIVE

## 2019-02-19 PROCEDURE — 74011250636 HC RX REV CODE- 250/636: Performed by: ANESTHESIOLOGY

## 2019-02-19 PROCEDURE — 77030020782 HC GWN BAIR PAWS FLX 3M -B

## 2019-02-19 PROCEDURE — 76210000016 HC OR PH I REC 1 TO 1.5 HR: Performed by: SPECIALIST

## 2019-02-19 PROCEDURE — 77030006932 HC BLD TYMP BVR BD -B: Performed by: SPECIALIST

## 2019-02-19 PROCEDURE — 77030014007 HC SPNG HEMSTAT J&J -B: Performed by: SPECIALIST

## 2019-02-19 PROCEDURE — 74011250636 HC RX REV CODE- 250/636

## 2019-02-19 PROCEDURE — 77030026438 HC STYL ET INTUB CARD -A: Performed by: NURSE ANESTHETIST, CERTIFIED REGISTERED

## 2019-02-19 PROCEDURE — 77030008684 HC TU ET CUF COVD -B: Performed by: NURSE ANESTHETIST, CERTIFIED REGISTERED

## 2019-02-19 PROCEDURE — 77030011267 HC ELECTRD BLD COVD -A: Performed by: SPECIALIST

## 2019-02-19 PROCEDURE — 74011000250 HC RX REV CODE- 250: Performed by: SPECIALIST

## 2019-02-19 PROCEDURE — 77030032490 HC SLV COMPR SCD KNE COVD -B: Performed by: SPECIALIST

## 2019-02-19 PROCEDURE — 76210000020 HC REC RM PH II FIRST 0.5 HR: Performed by: SPECIALIST

## 2019-02-19 PROCEDURE — 77030011640 HC PAD GRND REM COVD -A: Performed by: SPECIALIST

## 2019-02-19 PROCEDURE — 77030018846 HC SOL IRR STRL H20 ICUM -A: Performed by: SPECIALIST

## 2019-02-19 PROCEDURE — 77030014006 HC SPNG HEMSTAT J&J -A: Performed by: SPECIALIST

## 2019-02-19 PROCEDURE — 77030019615 HC ELCTRD EMG NDL MEDT -B: Performed by: SPECIALIST

## 2019-02-19 PROCEDURE — 77030006689 HC BLD OPHTH BVR BD -A: Performed by: SPECIALIST

## 2019-02-19 PROCEDURE — 77030004435 HC BUR RND STRY -C: Performed by: SPECIALIST

## 2019-02-19 PROCEDURE — 77030008570 HC TBNG SUC IRR GRAC -B: Performed by: SPECIALIST

## 2019-02-19 PROCEDURE — 77030031139 HC SUT VCRL2 J&J -A: Performed by: SPECIALIST

## 2019-02-19 PROCEDURE — C1713 ANCHOR/SCREW BN/BN,TIS/BN: HCPCS | Performed by: SPECIALIST

## 2019-02-19 PROCEDURE — 77030018836 HC SOL IRR NACL ICUM -A: Performed by: SPECIALIST

## 2019-02-19 PROCEDURE — 74011000250 HC RX REV CODE- 250

## 2019-02-19 PROCEDURE — 76010000162 HC OR TIME 1.5 TO 2 HR INTENSV-TIER 1: Performed by: SPECIALIST

## 2019-02-19 PROCEDURE — 81025 URINE PREGNANCY TEST: CPT

## 2019-02-19 PROCEDURE — 74011250636 HC RX REV CODE- 250/636: Performed by: SPECIALIST

## 2019-02-19 PROCEDURE — 76060000034 HC ANESTHESIA 1.5 TO 2 HR: Performed by: SPECIALIST

## 2019-02-19 PROCEDURE — 77030039266 HC ADH SKN EXOFIN S2SG -A: Performed by: SPECIALIST

## 2019-02-19 PROCEDURE — 74011250637 HC RX REV CODE- 250/637: Performed by: SPECIALIST

## 2019-02-19 DEVICE — 5CC HYDROSET INJECTABLE CEMENT
Type: IMPLANTABLE DEVICE | Site: EAR | Status: FUNCTIONAL
Brand: HYDROSET

## 2019-02-19 RX ORDER — MIDAZOLAM HYDROCHLORIDE 1 MG/ML
0.5 INJECTION, SOLUTION INTRAMUSCULAR; INTRAVENOUS
Status: DISCONTINUED | OUTPATIENT
Start: 2019-02-19 | End: 2019-02-19 | Stop reason: HOSPADM

## 2019-02-19 RX ORDER — MIDAZOLAM HYDROCHLORIDE 1 MG/ML
1 INJECTION, SOLUTION INTRAMUSCULAR; INTRAVENOUS AS NEEDED
Status: DISCONTINUED | OUTPATIENT
Start: 2019-02-19 | End: 2019-02-19 | Stop reason: HOSPADM

## 2019-02-19 RX ORDER — BACITRACIN 500 [USP'U]/G
OINTMENT TOPICAL
Status: DISCONTINUED | OUTPATIENT
Start: 2019-02-19 | End: 2019-02-19 | Stop reason: HOSPADM

## 2019-02-19 RX ORDER — SUCCINYLCHOLINE CHLORIDE 20 MG/ML
INJECTION INTRAMUSCULAR; INTRAVENOUS AS NEEDED
Status: DISCONTINUED | OUTPATIENT
Start: 2019-02-19 | End: 2019-02-19 | Stop reason: HOSPADM

## 2019-02-19 RX ORDER — HYDROMORPHONE HYDROCHLORIDE 2 MG/ML
INJECTION, SOLUTION INTRAMUSCULAR; INTRAVENOUS; SUBCUTANEOUS AS NEEDED
Status: DISCONTINUED | OUTPATIENT
Start: 2019-02-19 | End: 2019-02-19 | Stop reason: HOSPADM

## 2019-02-19 RX ORDER — OFLOXACIN 3 MG/ML
SOLUTION AURICULAR (OTIC) AS NEEDED
Status: DISCONTINUED | OUTPATIENT
Start: 2019-02-19 | End: 2019-02-19 | Stop reason: HOSPADM

## 2019-02-19 RX ORDER — OXYCODONE HYDROCHLORIDE 5 MG/1
5 TABLET ORAL AS NEEDED
Status: DISCONTINUED | OUTPATIENT
Start: 2019-02-19 | End: 2019-02-19 | Stop reason: HOSPADM

## 2019-02-19 RX ORDER — FENTANYL CITRATE 50 UG/ML
50 INJECTION, SOLUTION INTRAMUSCULAR; INTRAVENOUS AS NEEDED
Status: DISCONTINUED | OUTPATIENT
Start: 2019-02-19 | End: 2019-02-19 | Stop reason: HOSPADM

## 2019-02-19 RX ORDER — SODIUM CHLORIDE 9 MG/ML
25 INJECTION, SOLUTION INTRAVENOUS CONTINUOUS
Status: DISCONTINUED | OUTPATIENT
Start: 2019-02-19 | End: 2019-02-19 | Stop reason: HOSPADM

## 2019-02-19 RX ORDER — ONDANSETRON 2 MG/ML
4 INJECTION INTRAMUSCULAR; INTRAVENOUS AS NEEDED
Status: DISCONTINUED | OUTPATIENT
Start: 2019-02-19 | End: 2019-02-19 | Stop reason: HOSPADM

## 2019-02-19 RX ORDER — MIDAZOLAM HYDROCHLORIDE 1 MG/ML
INJECTION, SOLUTION INTRAMUSCULAR; INTRAVENOUS AS NEEDED
Status: DISCONTINUED | OUTPATIENT
Start: 2019-02-19 | End: 2019-02-19 | Stop reason: HOSPADM

## 2019-02-19 RX ORDER — LIDOCAINE HYDROCHLORIDE 20 MG/ML
INJECTION, SOLUTION EPIDURAL; INFILTRATION; INTRACAUDAL; PERINEURAL AS NEEDED
Status: DISCONTINUED | OUTPATIENT
Start: 2019-02-19 | End: 2019-02-19 | Stop reason: HOSPADM

## 2019-02-19 RX ORDER — FLUCONAZOLE 150 MG/1
150 TABLET ORAL
Qty: 1 TAB | Refills: 1 | Status: SHIPPED | OUTPATIENT
Start: 2019-02-19 | End: 2019-02-20

## 2019-02-19 RX ORDER — HYDROMORPHONE HYDROCHLORIDE 1 MG/ML
0.2 INJECTION, SOLUTION INTRAMUSCULAR; INTRAVENOUS; SUBCUTANEOUS
Status: DISCONTINUED | OUTPATIENT
Start: 2019-02-19 | End: 2019-02-19 | Stop reason: HOSPADM

## 2019-02-19 RX ORDER — ONDANSETRON 2 MG/ML
INJECTION INTRAMUSCULAR; INTRAVENOUS AS NEEDED
Status: DISCONTINUED | OUTPATIENT
Start: 2019-02-19 | End: 2019-02-19 | Stop reason: HOSPADM

## 2019-02-19 RX ORDER — ACETAMINOPHEN 10 MG/ML
INJECTION, SOLUTION INTRAVENOUS AS NEEDED
Status: DISCONTINUED | OUTPATIENT
Start: 2019-02-19 | End: 2019-02-19 | Stop reason: HOSPADM

## 2019-02-19 RX ORDER — CEFAZOLIN SODIUM/WATER 2 G/20 ML
2 SYRINGE (ML) INTRAVENOUS ONCE
Status: COMPLETED | OUTPATIENT
Start: 2019-02-19 | End: 2019-02-19

## 2019-02-19 RX ORDER — CEFDINIR 300 MG/1
600 CAPSULE ORAL DAILY
Qty: 10 CAP | Refills: 0 | Status: SHIPPED | OUTPATIENT
Start: 2019-02-19 | End: 2019-02-24

## 2019-02-19 RX ORDER — MORPHINE SULFATE 4 MG/ML
2 INJECTION INTRAVENOUS
Status: DISCONTINUED | OUTPATIENT
Start: 2019-02-19 | End: 2019-02-19 | Stop reason: HOSPADM

## 2019-02-19 RX ORDER — SODIUM CHLORIDE, SODIUM LACTATE, POTASSIUM CHLORIDE, CALCIUM CHLORIDE 600; 310; 30; 20 MG/100ML; MG/100ML; MG/100ML; MG/100ML
INJECTION, SOLUTION INTRAVENOUS
Status: DISCONTINUED | OUTPATIENT
Start: 2019-02-19 | End: 2019-02-19 | Stop reason: HOSPADM

## 2019-02-19 RX ORDER — SODIUM CHLORIDE 0.9 % (FLUSH) 0.9 %
5-40 SYRINGE (ML) INJECTION AS NEEDED
Status: DISCONTINUED | OUTPATIENT
Start: 2019-02-19 | End: 2019-02-19 | Stop reason: HOSPADM

## 2019-02-19 RX ORDER — SODIUM CHLORIDE, SODIUM LACTATE, POTASSIUM CHLORIDE, CALCIUM CHLORIDE 600; 310; 30; 20 MG/100ML; MG/100ML; MG/100ML; MG/100ML
25 INJECTION, SOLUTION INTRAVENOUS CONTINUOUS
Status: DISCONTINUED | OUTPATIENT
Start: 2019-02-19 | End: 2019-02-19 | Stop reason: HOSPADM

## 2019-02-19 RX ORDER — GLYCOPYRROLATE 0.2 MG/ML
INJECTION INTRAMUSCULAR; INTRAVENOUS AS NEEDED
Status: DISCONTINUED | OUTPATIENT
Start: 2019-02-19 | End: 2019-02-19 | Stop reason: HOSPADM

## 2019-02-19 RX ORDER — DIPHENHYDRAMINE HYDROCHLORIDE 50 MG/ML
12.5 INJECTION, SOLUTION INTRAMUSCULAR; INTRAVENOUS AS NEEDED
Status: DISCONTINUED | OUTPATIENT
Start: 2019-02-19 | End: 2019-02-19 | Stop reason: HOSPADM

## 2019-02-19 RX ORDER — LIDOCAINE HYDROCHLORIDE AND EPINEPHRINE 10; 10 MG/ML; UG/ML
1.5 INJECTION, SOLUTION INFILTRATION; PERINEURAL ONCE
Status: COMPLETED | OUTPATIENT
Start: 2019-02-19 | End: 2019-02-19

## 2019-02-19 RX ORDER — SODIUM CHLORIDE, SODIUM LACTATE, POTASSIUM CHLORIDE, CALCIUM CHLORIDE 600; 310; 30; 20 MG/100ML; MG/100ML; MG/100ML; MG/100ML
125 INJECTION, SOLUTION INTRAVENOUS CONTINUOUS
Status: DISCONTINUED | OUTPATIENT
Start: 2019-02-19 | End: 2019-02-19 | Stop reason: HOSPADM

## 2019-02-19 RX ORDER — LIDOCAINE HYDROCHLORIDE 10 MG/ML
0.1 INJECTION, SOLUTION EPIDURAL; INFILTRATION; INTRACAUDAL; PERINEURAL AS NEEDED
Status: DISCONTINUED | OUTPATIENT
Start: 2019-02-19 | End: 2019-02-19 | Stop reason: HOSPADM

## 2019-02-19 RX ORDER — FENTANYL CITRATE 50 UG/ML
INJECTION, SOLUTION INTRAMUSCULAR; INTRAVENOUS AS NEEDED
Status: DISCONTINUED | OUTPATIENT
Start: 2019-02-19 | End: 2019-02-19 | Stop reason: HOSPADM

## 2019-02-19 RX ORDER — EPINEPHRINE NASAL SOLUTION 1 MG/ML
2 SOLUTION NASAL
Status: COMPLETED | OUTPATIENT
Start: 2019-02-19 | End: 2019-02-19

## 2019-02-19 RX ORDER — DEXAMETHASONE SODIUM PHOSPHATE 4 MG/ML
INJECTION, SOLUTION INTRA-ARTICULAR; INTRALESIONAL; INTRAMUSCULAR; INTRAVENOUS; SOFT TISSUE AS NEEDED
Status: DISCONTINUED | OUTPATIENT
Start: 2019-02-19 | End: 2019-02-19 | Stop reason: HOSPADM

## 2019-02-19 RX ORDER — FENTANYL CITRATE 50 UG/ML
25 INJECTION, SOLUTION INTRAMUSCULAR; INTRAVENOUS
Status: DISCONTINUED | OUTPATIENT
Start: 2019-02-19 | End: 2019-02-19 | Stop reason: HOSPADM

## 2019-02-19 RX ORDER — PROPOFOL 10 MG/ML
INJECTION, EMULSION INTRAVENOUS AS NEEDED
Status: DISCONTINUED | OUTPATIENT
Start: 2019-02-19 | End: 2019-02-19 | Stop reason: HOSPADM

## 2019-02-19 RX ORDER — SODIUM CHLORIDE 0.9 % (FLUSH) 0.9 %
5-40 SYRINGE (ML) INJECTION EVERY 8 HOURS
Status: DISCONTINUED | OUTPATIENT
Start: 2019-02-19 | End: 2019-02-19 | Stop reason: HOSPADM

## 2019-02-19 RX ORDER — HYDROCODONE BITARTRATE AND ACETAMINOPHEN 5; 325 MG/1; MG/1
1 TABLET ORAL
Qty: 20 TAB | Refills: 0 | Status: SHIPPED | OUTPATIENT
Start: 2019-02-19 | End: 2019-03-11 | Stop reason: ALTCHOICE

## 2019-02-19 RX ORDER — BACITRACIN 500 [USP'U]/G
OINTMENT TOPICAL AS NEEDED
Status: DISCONTINUED | OUTPATIENT
Start: 2019-02-19 | End: 2019-02-19 | Stop reason: HOSPADM

## 2019-02-19 RX ORDER — DEXAMETHASONE SODIUM PHOSPHATE 10 MG/ML
9 INJECTION INTRAMUSCULAR; INTRAVENOUS ONCE
Status: DISCONTINUED | OUTPATIENT
Start: 2019-02-19 | End: 2019-02-19 | Stop reason: HOSPADM

## 2019-02-19 RX ORDER — ROCURONIUM BROMIDE 10 MG/ML
INJECTION, SOLUTION INTRAVENOUS AS NEEDED
Status: DISCONTINUED | OUTPATIENT
Start: 2019-02-19 | End: 2019-02-19 | Stop reason: HOSPADM

## 2019-02-19 RX ADMIN — SODIUM CHLORIDE, SODIUM LACTATE, POTASSIUM CHLORIDE, CALCIUM CHLORIDE: 600; 310; 30; 20 INJECTION, SOLUTION INTRAVENOUS at 12:25

## 2019-02-19 RX ADMIN — SODIUM CHLORIDE, SODIUM LACTATE, POTASSIUM CHLORIDE, AND CALCIUM CHLORIDE 25 ML/HR: 600; 310; 30; 20 INJECTION, SOLUTION INTRAVENOUS at 11:32

## 2019-02-19 RX ADMIN — ONDANSETRON 4 MG: 2 INJECTION INTRAMUSCULAR; INTRAVENOUS at 14:42

## 2019-02-19 RX ADMIN — ACETAMINOPHEN 1000 MG: 10 INJECTION, SOLUTION INTRAVENOUS at 13:45

## 2019-02-19 RX ADMIN — LIDOCAINE HYDROCHLORIDE 100 MG: 20 INJECTION, SOLUTION EPIDURAL; INFILTRATION; INTRACAUDAL; PERINEURAL at 12:33

## 2019-02-19 RX ADMIN — MIDAZOLAM HYDROCHLORIDE 2 MG: 1 INJECTION, SOLUTION INTRAMUSCULAR; INTRAVENOUS at 12:23

## 2019-02-19 RX ADMIN — ROCURONIUM BROMIDE 5 MG: 10 INJECTION, SOLUTION INTRAVENOUS at 12:33

## 2019-02-19 RX ADMIN — DEXAMETHASONE SODIUM PHOSPHATE 8 MG: 4 INJECTION, SOLUTION INTRA-ARTICULAR; INTRALESIONAL; INTRAMUSCULAR; INTRAVENOUS; SOFT TISSUE at 12:45

## 2019-02-19 RX ADMIN — HYDROMORPHONE HYDROCHLORIDE 1 MG: 2 INJECTION, SOLUTION INTRAMUSCULAR; INTRAVENOUS; SUBCUTANEOUS at 12:42

## 2019-02-19 RX ADMIN — PROPOFOL 200 MG: 10 INJECTION, EMULSION INTRAVENOUS at 12:33

## 2019-02-19 RX ADMIN — FENTANYL CITRATE 50 MCG: 50 INJECTION, SOLUTION INTRAMUSCULAR; INTRAVENOUS at 12:33

## 2019-02-19 RX ADMIN — Medication 2 G: at 12:39

## 2019-02-19 RX ADMIN — ONDANSETRON 4 MG: 2 INJECTION INTRAMUSCULAR; INTRAVENOUS at 13:58

## 2019-02-19 RX ADMIN — FENTANYL CITRATE 50 MCG: 50 INJECTION, SOLUTION INTRAMUSCULAR; INTRAVENOUS at 12:41

## 2019-02-19 RX ADMIN — HYDROMORPHONE HYDROCHLORIDE 1 MG: 2 INJECTION, SOLUTION INTRAMUSCULAR; INTRAVENOUS; SUBCUTANEOUS at 12:59

## 2019-02-19 RX ADMIN — SUCCINYLCHOLINE CHLORIDE 160 MG: 20 INJECTION INTRAMUSCULAR; INTRAVENOUS at 12:33

## 2019-02-19 RX ADMIN — GLYCOPYRROLATE 0.2 MG: 0.2 INJECTION INTRAMUSCULAR; INTRAVENOUS at 12:45

## 2019-02-19 NOTE — DISCHARGE INSTRUCTIONS
[facility]  POSTOPERATIVE INSTRUCTIONS  FOR PATIENTS UNDERGOING  CHRONIC EAR, MIDDLE EAR OR STAPES SURGERY    1. Do not blow your nose for three weeks following surgery. If you sneeze or cough do so with your mouth open. 2. Avoid any heavy lifting (over 10 lbs.), straining or bending for three weeks following surgery. 3. Keep your head elevated as much as possible x 48 hours . Sleep and rest on two to three pillows if possible. 4. Do not get water in your ear. If showering or washing your hair place a piece of cotton coated in Vaseline in the ear canal to seal it. If there is a separate incision keep this dry x 48 hours. 5. If you wear glasses either remove the arm on the operated side or make certain that it does not rest on the incision behind your ear for one week. 6. Beginning one day after surgery try to leave the cotton out of our ear as much as possible unless there is significant drainage. 7. Some drainage from your ear canal may occur after surgery. If there is a separate incision some drainage may occur from this area also. If the drainage is profuse or develops a foul odor please call. 8. Popping sounds, a plugged sensation, ringing or fluctuating hearing may be noticed in the ear during the healing. 9. Avoid travel by air for three weeks following surgery. 10. If you should notice any swelling, redness or excessive pain please call. 11. Some dizziness may occur after surgery. If it becomes severe or is associated with nausea or vomiting please call. 12. Please call The [address] [place] [address] [place]. to make an appointment to be seen 7-10 days after the time of your surgery unless stated otherwise by your physician. I understand and acknowledge receipt of the instructions indicated above.                                                                                                                                            Physician's or R.N.'s Signature Date/Time                                                                                                                                           Patient or Representative Signature                                                          Date/Time          [address] M.D.  [facility], [address] [place] [address]  [phone]      DISCHARGE SUMMARY from Nurse    ALL CLOTHING/VALUABLES RETURNED TO PATIENT BEFORE D/C HOME    PATIENT INSTRUCTIONS:    The first meal should be something that is light; not greasy or spicy. If this is tolerated, then advance to regular diet as tolerated. After general anesthesia or intravenous sedation, for 24 hours or while taking prescription Narcotics:  · Limit your activities  · Do not drive and operate hazardous machinery  · Do not make important personal or business decisions  · Do  not drink alcoholic beverages  · If you have not urinated within 8 hours after discharge, please contact your surgeon on call. Pain  · Take pain medication as directed by your doctor  ·  Call your doctor if pain is NOT relieved by medication  · DO NOT take aspirin or blood thinners until directed by your doctor    Report the following to your surgeon:  · Excessive pain, swelling, redness or odor of or around the surgical area  · Temperature over 100.5  · Nausea and vomiting lasting longer than 4 hours or if unable to take medications  · Any signs of decreased circulation or nerve impairment to extremity: change in color, persistent  numbness, tingling, coldness or increase pain  · Any questions    ALSO:  [facility] [place] [facility] Phone Calls  · Are usually made nursing staff, the day after your surgery  · If you have any problems, call your doctor as needed      The discharge information has been reviewed.     *  Please give a list of your current medications to your Primary Care Provider. *  Please update this list whenever your medications are discontinued, doses are      changed, or new medications (including over-the-counter products) are added. *  Please carry medication information at all times in case of emergency situations.

## 2019-02-19 NOTE — BRIEF OP NOTE
BRIEF OPERATIVE NOTE    Date of Procedure: 2/19/2019   Preoperative Diagnosis: Pulsatile tinnitus of left ear [Q62. A2]  Postoperative Diagnosis: Pulsatile tinnitus of left ear [id number]. A2    Procedure(s):  LEFT TYMPANOMASTOID / CRANIOPLASTY, PLACEMENT OF FACIAL NERVE MONITORING ELECTRODES  Surgeon(s) and Role:     * Shama Ceron MD - Primary     * Laureen Santos MD - Resident - Assisting         Surgical Assistant: none    Surgical Staff:  Circ-1: Capri Lugo RN  Circ-Relief: Yolie Golden  Scrub RN-1: Kelsi Olson RN  Scrub RN-Relief: Jonna Bautista RN  Event Time In Time Out   Incision Start 1251    Incision Close       Anesthesia: General   Estimated Blood Loss:  5cc  Specimens: * No specimens in log *   Findings: dehiscent jugular   Complications: none  Implants:   Implant Name Type Inv.  Item Serial No.  Lot No. LRB No. Used Action   hydroset   [id number]  [id number] Left 1 Implanted

## 2019-02-19 NOTE — H&P
Otolaryngology  History and Physical    Subjective:      Fransisco Dalton is a 26 y.o. female who presents for repair of left transverse sinus for pulsatile tinnitus. Past Medical History:   Diagnosis Date    Headache      Past Surgical History:   Procedure Laterality Date    HX GYN      D&C    HX WISDOM TEETH EXTRACTION        Family History   Problem Relation Age of Onset    Diabetes Mother     Arrhythmia Mother     Heart Disease Paternal Grandmother     Cancer Paternal Grandmother         colon and breast    Hypertension Father     Anesth Problems Neg Hx      Social History     Tobacco Use    Smoking status: Never Smoker    Smokeless tobacco: Never Used   Substance Use Topics    Alcohol use: Yes     Alcohol/week: 0.6 oz     Types: 1 Glasses of wine per week     Frequency: Never     Comment: occassional 1/wk      Prior to Admission medications    Not on File      No Known Allergies    Review of Systems:  A comprehensive review of systems was negative except for that written in the History of Present Illness. Objective:        Patient Vitals for the past 8 hrs:   BP Temp Resp SpO2 Height Weight   19 1102 (!) 146/96 98.2 °F (36.8 °C) 17 100 % 5' 6\" (1.676 m) 108.9 kg (240 lb)       Temp (24hrs), Av.2 °F (36.8 °C), Min:98.2 °F (36.8 °C), Max:98.2 °F (36.8 °C)      Physical Exam:  GENERAL: alert, cooperative, no distress, appears stated age, LYMPHATIC: Cervical, supraclavicular, and axillary nodes normal. , THROAT & NECK: normal and no erythema or exudates noted. , LUNG: clear to auscultation bilaterally, HEART: regular rate and rhythm, S1, S2 normal, no murmur, click, rub or gallop AU clear     Assessment:     Left pulsatile tinnitus     Plan:     Discussed the risk of surgery including  Facial paralysis, infection, total hearing loss, continued pulsatile tinnitus,  and the risks of general anesthetic.   The patient understands the risks; any and all questions were answered to the patient's satisfaction.     Signed By: Violette Phillips MD     February 19, 2019

## 2019-02-19 NOTE — ANESTHESIA POSTPROCEDURE EVALUATION
Post-Anesthesia Evaluation and Assessment    Patient: Uzma Wu MRN: [id number]  SSN: [id number]    YOB: 1990  Age: 26 y.o. Sex: female      I have evaluated the patient and they are stable and ready for discharge from the PACU. Cardiovascular Function/Vital Signs  Visit Vitals  /85   Pulse 80   Temp 37.2 °C (98.9 °F)   Resp 12   Ht 5' 6\" (1.676 m)   Wt 108.9 kg (240 lb)   SpO2 99%   BMI 38.74 kg/m²       Patient is status post General anesthesia for Procedure(s):  LEFT TYMPANOMASTOID / CRANIOPLASTY, PLACEMENT OF FACIAL NERVE MONITORING ELECTRODES. Nausea/Vomiting: None    Postoperative hydration reviewed and adequate. Pain:  Pain Scale 1: Numeric (0 - 10) (02/19/19 1430)  Pain Intensity 1: 0 (02/19/19 1430)   Managed    Neurological Status:   Neuro (WDL): Within Defined Limits (02/19/19 1430)   At baseline    Mental Status, Level of Consciousness: Alert and  oriented to person, place, and time    Pulmonary Status:   O2 Device: Room air (02/19/19 1500)   Adequate oxygenation and airway patent    Complications related to anesthesia: None    Post-anesthesia assessment completed. No concerns    Signed By: Bijal Mo MD     February 19, 2019              Procedure(s):  LEFT TYMPANOMASTOID / CRANIOPLASTY, PLACEMENT OF FACIAL NERVE MONITORING ELECTRODES.     <BSHSIANPOST>    Visit Vitals  /85   Pulse 80   Temp 37.2 °C (98.9 °F)   Resp 12   Ht 5' 6\" (1.676 m)   Wt 108.9 kg (240 lb)   SpO2 99%   BMI 38.74 kg/m²

## 2019-02-20 NOTE — OP NOTES
[address]   OPERATIVE REPORT    Name:  Jackie Locke  MR#:  [id number]  :  1990  ACCOUNT #:  [de-identified]  DATE OF SERVICE:  2019    PREOPERATIVE DIAGNOSIS:  Left pulsatile tinnitus and dehiscent transverse sinus. POSTOPERATIVE DIAGNOSIS:  Left pulsatile tinnitus and dehiscent transverse sinus. PROCEDURES PERFORMED:  1. Left tympanomastoidectomy with repair of major vessel. 2.  Cranioplasty. 3.  Facial nerve monitoring x1 hour. 4.  Placement of facial nerve electrodes. 5.  Microdissection. SURGEON:  Moose Oglesby MD    ASSISTANT:  None. ANESTHESIA:  General.    COMPLICATIONS:  None. SPECIMENS REMOVED:  None. IMPLANTS:  None. ESTIMATED BLOOD LOSS:  5 mL. FINDINGS:  1. Dehiscent sigmoid sinus in the mastoid. 2.  One prominent emissary vein identified and lysed. PROCEDURE IN DETAIL:  After the patient received informed consent, she was taken to  the operating room. The patient was kept in the supine position, dressed and draped  in the usual fashion. After the administration of general anesthesia, the table was  turned to 180 degrees away for the neutral position. The patient had electrode placed in the left orbicularis oris and orbicularis oculi  muscles. Continuous facial nerve monitoring took place for the entire case. The  baseline facial muscular activity was less than 10 milliohms. There was no  verification of stimulation throughout the entire case. The electrode impedances  were checked preoperatively and found to be less than 1.0 kiloohms. A cap test was  utilized to verify in facial nerve monitoring technique. The operation began on the  left side. 10 mL of 1% lidocaine with 1:100,000 epinephrine was injected into postauricular site  on the left side. The deep tissue was then incised using a 15 blade. Soft tissue  overlying the mastoid cavity was held in place with self-retracting retractor.   There  was one large emissary vein was encountered at this point, which was lysed and sealed  with bone wax. Next, a mastoidectomy was then performed using both cutting and benjy bur, and  mastoids were opened. Immediately, the bone appeared to be well aerated and there  was dehiscence on the sigmoid sinus in the mastoid area, near the encounter with the  transverse sinus. The sigmoid sinus was followed down until the jugular bulb, and  jugular bulb appeared to be normal and covered with bone. Sigmoid sinus was also  followed back towards the transverse sinus, and the transverse sinus was exposed, and  there was no obvious diverticulum or rationale for the pulsatile tinnitus. At this  point, the entire sigmoid sinus was revealed, and there were no other arterial or  venous connections that were obvious at this point. Using 5 mL of HydroSet gel inserted into the maxillary cavity, we covered to delete  the defect that was made from the _____ and to cover the sigmoid sinus. Next, the  soft tissue overlying the mastoid cavity was then closed in an interrupted fashion. Dermabond was then used to the skin and a mastoid dressing was placed. All counts  were correct at the end of the case. The patient tolerated the procedure well, went  to PACU in stable condition.       Madeline Faulkner MD      [id number]  D:  02/19/2019 14:33  T:  02/20/2019 5:19  JOB #:  [id number]

## 2019-03-11 ENCOUNTER — OFFICE VISIT (OUTPATIENT)
Dept: INTERNAL MEDICINE CLINIC | Age: 29
End: 2019-03-11

## 2019-03-11 VITALS
TEMPERATURE: 98.4 F | WEIGHT: 256.2 LBS | HEART RATE: 74 BPM | OXYGEN SATURATION: 98 % | BODY MASS INDEX: 41.17 KG/M2 | SYSTOLIC BLOOD PRESSURE: 130 MMHG | RESPIRATION RATE: 19 BRPM | HEIGHT: 66 IN | DIASTOLIC BLOOD PRESSURE: 99 MMHG

## 2019-03-11 DIAGNOSIS — Z13.220 LIPID SCREENING: ICD-10-CM

## 2019-03-11 DIAGNOSIS — F51.01 PRIMARY INSOMNIA: ICD-10-CM

## 2019-03-11 DIAGNOSIS — F41.8 SITUATIONAL ANXIETY: Primary | ICD-10-CM

## 2019-03-11 NOTE — PROGRESS NOTES
Acute Care Note    Armani Schneider is 26 y.o. female. she presents for evaluation of Insomnia and Anxiety      The patient has had some ongoing anxiety since starting a new job. 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  She notes that with her job she has had increased responsibilities and is feeling anxious about this. She has difficulty sleeping at times and as such is very fatigued during the day. She has attempted to exercise however notes that she is often too tired to exercise in the morning and does not feel like going to the gym after a days work. Of note, she has a family history of thyroid problems. She has been drinking more caffeine than usual recently. She has had a coffee and a soda in the evening. We discussed the impact this on her sleeping. Prior to Admission medications    Medication Sig Start Date End Date Taking? Authorizing Provider   levonorgestrel Michael Fontaine) 17.5 mcg/24 hr (5 years) IUD IUD Kyleena 17.5 mcg/24 hrs (5yrs) 19.5mg intrauterine device   Take by intrauterine route. 1/8/19  Yes Provider, Historical         Patient Active Problem List   Diagnosis Code    Seasonal allergic rhinitis due to pollen J30.1    Pulsatile tinnitus of left ear H93. A2    Persistent headaches R51    Severe obesity (HCC) E66.01         Review of Systems   Constitutional: Positive for malaise/fatigue. Psychiatric/Behavioral: The patient has insomnia. Visit Vitals  BP (!) 130/99 (BP 1 Location: Right arm, BP Patient Position: Sitting)   Pulse 74   Temp 98.4 °F (36.9 °C) (Oral)   Resp 19   Ht 5' 6\" (1.676 m)   Wt 256 lb 3.2 oz (116.2 kg)   SpO2 98%   BMI 41.35 kg/m²       Physical Exam   Constitutional: No distress. HENT:   Mouth/Throat: Oropharynx is clear and moist.   Neck: Neck supple. No thyromegaly present. Cardiovascular: Normal rate and regular rhythm.    Pulmonary/Chest: Effort normal and breath sounds normal.           ASSESSMENT/PLAN  Diagnoses and all orders for this visit:    1. Situational anxiety -I suspect the patient's anxiety is likely to be transient and related to her newfound responsibilities. Discussed with the patient stress relieving activities that she can participate in. For now, will monitor. 2. Primary insomnia -I discussed the use of valerian root with the patient. She has never tried this and will consider using it for her insomnia. We will also check lab work. Given her family history of thyroid issue, it is prudent to look for this. -     METABOLIC PANEL, COMPREHENSIVE  -     TSH 3RD GENERATION  -     CBC WITH AUTOMATED DIFF    3. Lipid screening  -     LIPID PANEL         Advised the patient to call back or return to office if symptoms worsen/change/persist.   Discussed expected course/resolution/complications of diagnosis in detail with patient. Medication risks/benefits/costs/interactions/alternatives discussed with patient. The patient was given an after visit summary which includes diagnoses, current medications, & vitals. They expressed understanding with the diagnosis and plan.

## 2019-03-13 LAB
ALBUMIN SERPL-MCNC: 4.6 G/DL (ref 3.5–5.5)
ALBUMIN/GLOB SERPL: 1.4 {RATIO} (ref 1.2–2.2)
ALP SERPL-CCNC: 113 IU/L (ref 39–117)
ALT SERPL-CCNC: 11 IU/L (ref 0–32)
AST SERPL-CCNC: 16 IU/L (ref 0–40)
BASOPHILS # BLD AUTO: 0 X10E3/UL (ref 0–0.2)
BASOPHILS NFR BLD AUTO: 0 %
BILIRUB SERPL-MCNC: 0.5 MG/DL (ref 0–1.2)
BUN SERPL-MCNC: 10 MG/DL (ref 6–20)
BUN/CREAT SERPL: 16 (ref 9–23)
CALCIUM SERPL-MCNC: 9.8 MG/DL (ref 8.7–10.2)
CHLORIDE SERPL-SCNC: 106 MMOL/L (ref 96–106)
CHOLEST SERPL-MCNC: 139 MG/DL (ref 100–199)
CO2 SERPL-SCNC: 23 MMOL/L (ref 20–29)
CREAT SERPL-MCNC: 0.64 MG/DL (ref 0.57–1)
EOSINOPHIL # BLD AUTO: 0.2 X10E3/UL (ref 0–0.4)
EOSINOPHIL NFR BLD AUTO: 3 %
ERYTHROCYTE [DISTWIDTH] IN BLOOD BY AUTOMATED COUNT: 13.6 % (ref 12.3–15.4)
GLOBULIN SER CALC-MCNC: 3.3 G/DL (ref 1.5–4.5)
GLUCOSE SERPL-MCNC: 85 MG/DL (ref 65–99)
HCT VFR BLD AUTO: 39.8 % (ref 34–46.6)
HDLC SERPL-MCNC: 55 MG/DL
HGB BLD-MCNC: 12.7 G/DL (ref 11.1–15.9)
IMM GRANULOCYTES # BLD AUTO: 0 X10E3/UL (ref 0–0.1)
IMM GRANULOCYTES NFR BLD AUTO: 0 %
LDLC SERPL CALC-MCNC: 71 MG/DL (ref 0–99)
LYMPHOCYTES # BLD AUTO: 2.1 X10E3/UL (ref 0.7–3.1)
LYMPHOCYTES NFR BLD AUTO: 30 %
MCH RBC QN AUTO: 27.9 PG (ref 26.6–33)
MCHC RBC AUTO-ENTMCNC: 31.9 G/DL (ref 31.5–35.7)
MCV RBC AUTO: 88 FL (ref 79–97)
MONOCYTES # BLD AUTO: 0.3 X10E3/UL (ref 0.1–0.9)
MONOCYTES NFR BLD AUTO: 4 %
NEUTROPHILS # BLD AUTO: 4.4 X10E3/UL (ref 1.4–7)
NEUTROPHILS NFR BLD AUTO: 63 %
PLATELET # BLD AUTO: 345 X10E3/UL (ref 150–379)
POTASSIUM SERPL-SCNC: 4.3 MMOL/L (ref 3.5–5.2)
PROT SERPL-MCNC: 7.9 G/DL (ref 6–8.5)
RBC # BLD AUTO: 4.55 X10E6/UL (ref 3.77–5.28)
SODIUM SERPL-SCNC: 143 MMOL/L (ref 134–144)
TRIGL SERPL-MCNC: 64 MG/DL (ref 0–149)
TSH SERPL DL<=0.005 MIU/L-ACNC: 0.9 UIU/ML (ref 0.45–4.5)
VLDLC SERPL CALC-MCNC: 13 MG/DL (ref 5–40)
WBC # BLD AUTO: 7 X10E3/UL (ref 3.4–10.8)

## 2020-07-14 ENCOUNTER — EMPLOYEE WELLNESS (OUTPATIENT)
Dept: OTHER | Facility: CLINIC | Age: 30
End: 2020-07-14

## 2020-07-14 LAB
CHOLEST SERPL-MCNC: 132 MG/DL
GLUCOSE SERPL-MCNC: 89 MG/DL (ref 65–100)
HDLC SERPL-MCNC: 52 MG/DL
LDLC SERPL CALC-MCNC: 65 MG/DL (ref 0–100)
TRIGL SERPL-MCNC: 75 MG/DL (ref ?–150)

## 2021-06-28 LAB
CHOLEST SERPL-MCNC: 118 MG/DL
GLUCOSE SERPL-MCNC: 101 MG/DL (ref 65–100)
HDLC SERPL-MCNC: 55 MG/DL
LDLC SERPL CALC-MCNC: 55.2 MG/DL (ref 0–100)
TRIGL SERPL-MCNC: 39 MG/DL (ref ?–150)

## 2021-06-28 NOTE — ANESTHESIA PREPROCEDURE EVALUATION
Anesthetic History   No history of anesthetic complications            Review of Systems / Medical History  Patient summary reviewed, nursing notes reviewed and pertinent labs reviewed    Pulmonary  Within defined limits                 Neuro/Psych   Within defined limits           Cardiovascular  Within defined limits                Exercise tolerance: >4 METS     GI/Hepatic/Renal  Within defined limits              Endo/Other        Obesity     Other Findings              Physical Exam    Airway  Mallampati: II  TM Distance: > 6 cm  Neck ROM: normal range of motion   Mouth opening: Normal     Cardiovascular  Regular rate and rhythm,  S1 and S2 normal,  no murmur, click, rub, or gallop             Dental  No notable dental hx       Pulmonary  Breath sounds clear to auscultation               Abdominal  GI exam deferred       Other Findings            Anesthetic Plan    ASA: 2  Anesthesia type: general          Induction: Intravenous  Anesthetic plan and risks discussed with: Patient Famotidine 20 mg twice daily    Preventive Health Recommendations  Female Ages 50 - 64    Yearly exam: See your health care provider every year in order to  o Review health changes.   o Discuss preventive care.    o Review your medicines if your doctor has prescribed any.      Get a Pap test every three years (unless you have an abnormal result and your provider advises testing more often).    If you get Pap tests with HPV test, you only need to test every 5 years, unless you have an abnormal result.     You do not need a Pap test if your uterus was removed (hysterectomy) and you have not had cancer.    You should be tested each year for STDs (sexually transmitted diseases) if you're at risk.     Have a mammogram every 1 to 2 years.    Have a colonoscopy at age 50, or have a yearly FIT test (stool test). These exams screen for colon cancer.      Have a cholesterol test every 5 years, or more often if advised.    Have a diabetes test (fasting glucose) every three years. If you are at risk for diabetes, you should have this test more often.     If you are at risk for osteoporosis (brittle bone disease), think about having a bone density scan (DEXA).    Shots: Get a flu shot each year. Get a tetanus shot every 10 years.    Nutrition:     Eat at least 5 servings of fruits and vegetables each day.    Eat whole-grain bread, whole-wheat pasta and brown rice instead of white grains and rice.    Get adequate Calcium and Vitamin D.     Lifestyle    Exercise at least 150 minutes a week (30 minutes a day, 5 days a week). This will help you control your weight and prevent disease.    Limit alcohol to one drink per day.    No smoking.     Wear sunscreen to prevent skin cancer.     See your dentist every six months for an exam and cleaning.    See your eye doctor every 1 to 2 years.

## 2021-07-20 LAB
ANTIBODY SCREEN, EXTERNAL: NEGATIVE
CHLAMYDIA, EXTERNAL: NEGATIVE
HBSAG, EXTERNAL: NON REACTIVE
HEPATITIS C AB,   EXT: NON REACTIVE
HIV, EXTERNAL: NON REACTIVE
N. GONORRHEA, EXTERNAL: NEGATIVE
RUBELLA, EXTERNAL: 8.3
T. PALLIDUM, EXTERNAL: NON REACTIVE
TYPE, ABO & RH, EXTERNAL: NORMAL

## 2021-09-24 NOTE — TELEPHONE ENCOUNTER
Dr. Radha Rainey, patient interested in free prenatal vitamins and iron per response from [address] [facility]. Order for [address] Baby Box pending for your convenience. Thank you,  NATALY Dunbar PharmD, [address] [place] [address], toll free: [phone]    =======================================================================    POPULATION HEALTH CLINICAL PHARMACY REVIEW - Be Well With [address] is a 27 y.o. female currently identified as interested in receiving a [address] \"Baby Box\" containing a 1 year supply of prenatal vitamins from [facility]. Contents of Baby Box:   Welcome Letter   6 month supply of Nature's Blend Prenatal Multivitamin with Minerals (Take 1 softgel once daily) with 1 refill    Thank you  NATALY Dunbar PharmD, [address] [place] [address], toll free: [phone]      For Pharmacy Admin Tracking Only     CPA in place:  No   Recommendation Provided To: Provider: 1 via Note to Provider   Intervention Detail: New Rx: 1, reason: Patient Preference   Gap Closed?: Yes    Intervention Accepted By: Provider: 1   Time Spent (min): 10

## 2021-12-06 LAB — T. PALLIDUM, EXTERNAL: NON REACTIVE

## 2022-02-04 LAB — GRBS, EXTERNAL: POSITIVE

## 2022-02-16 ENCOUNTER — TRANSCRIBE ORDER (OUTPATIENT)
Dept: REGISTRATION | Age: 32
End: 2022-02-16

## 2022-02-16 ENCOUNTER — HOSPITAL ENCOUNTER (OUTPATIENT)
Dept: PREADMISSION TESTING | Age: 32
Discharge: HOME | End: 2022-02-16
Attending: OBSTETRICS & GYNECOLOGY

## 2022-02-16 DIAGNOSIS — U07.1 COVID-19: ICD-10-CM

## 2022-02-16 DIAGNOSIS — U07.1 COVID-19: Primary | ICD-10-CM

## 2022-02-16 LAB
SARS-COV-2, XPLCVT: DETECTED
SOURCE, COVRS: ABNORMAL

## 2022-02-16 PROCEDURE — U0005 INFEC AGEN DETEC AMPLI PROBE: HCPCS

## 2022-02-21 ENCOUNTER — HOSPITAL ENCOUNTER (INPATIENT)
Age: 32
LOS: 3 days | Discharge: HOME | End: 2022-02-24
Attending: OBSTETRICS & GYNECOLOGY | Admitting: OBSTETRICS & GYNECOLOGY
Payer: COMMERCIAL

## 2022-02-21 DIAGNOSIS — Z98.891 S/P CESAREAN SECTION: Primary | ICD-10-CM

## 2022-02-21 PROBLEM — Z3A.39 39 WEEKS GESTATION OF PREGNANCY: Status: ACTIVE | Noted: 2022-02-21

## 2022-02-21 LAB
A1 MICROGLOB PLACENTAL VAG QL: POSITIVE
ALBUMIN SERPL-MCNC: 2.6 G/DL (ref 3.5–5)
ALBUMIN/GLOB SERPL: 0.6 {RATIO} (ref 1.1–2.2)
ALP SERPL-CCNC: 210 U/L (ref 45–117)
ALT SERPL-CCNC: 37 U/L (ref 12–78)
ANION GAP SERPL CALC-SCNC: 8 MMOL/L (ref 5–15)
AST SERPL-CCNC: 52 U/L (ref 15–37)
BILIRUB SERPL-MCNC: 0.4 MG/DL (ref 0.2–1)
BUN SERPL-MCNC: 14 MG/DL (ref 6–20)
BUN/CREAT SERPL: 29 (ref 12–20)
CALCIUM SERPL-MCNC: 9.3 MG/DL (ref 8.5–10.1)
CHLORIDE SERPL-SCNC: 108 MMOL/L (ref 97–108)
CO2 SERPL-SCNC: 19 MMOL/L (ref 21–32)
CONTROL LINE PRESENT?: NORMAL
CREAT SERPL-MCNC: 0.48 MG/DL (ref 0.55–1.02)
CREAT UR-MCNC: 169 MG/DL
ERYTHROCYTE [DISTWIDTH] IN BLOOD BY AUTOMATED COUNT: 14 % (ref 11.5–14.5)
EXPIRATION DATE: NORMAL
GLOBULIN SER CALC-MCNC: 4.2 G/DL (ref 2–4)
GLUCOSE SERPL-MCNC: 94 MG/DL (ref 65–100)
HCT VFR BLD AUTO: 34.5 % (ref 35–47)
HGB BLD-MCNC: 11.3 G/DL (ref 11.5–16)
INTERNAL NEGATIVE CONTROL: NORMAL
KIT LOT NO.: NORMAL
LDH SERPL L TO P-CCNC: 383 U/L (ref 81–246)
MCH RBC QN AUTO: 27.8 PG (ref 26–34)
MCHC RBC AUTO-ENTMCNC: 32.8 G/DL (ref 30–36.5)
MCV RBC AUTO: 85 FL (ref 80–99)
NRBC # BLD: 0 K/UL (ref 0–0.01)
NRBC BLD-RTO: 0 PER 100 WBC
PLATELET # BLD AUTO: 277 K/UL (ref 150–400)
PMV BLD AUTO: 11 FL (ref 8.9–12.9)
POTASSIUM SERPL-SCNC: 4.6 MMOL/L (ref 3.5–5.1)
PROT SERPL-MCNC: 6.8 G/DL (ref 6.4–8.2)
PROT UR-MCNC: 25 MG/DL (ref 0–11.9)
PROT/CREAT UR-RTO: 0.1
RBC # BLD AUTO: 4.06 M/UL (ref 3.8–5.2)
SODIUM SERPL-SCNC: 135 MMOL/L (ref 136–145)
URATE SERPL-MCNC: 4.1 MG/DL (ref 2.6–6)
WBC # BLD AUTO: 12.3 K/UL (ref 3.6–11)

## 2022-02-21 PROCEDURE — 74011250636 HC RX REV CODE- 250/636: Performed by: MIDWIFE

## 2022-02-21 PROCEDURE — 85027 COMPLETE CBC AUTOMATED: CPT

## 2022-02-21 PROCEDURE — 99285 EMERGENCY DEPT VISIT HI MDM: CPT

## 2022-02-21 PROCEDURE — 75410000002 HC LABOR FEE PER 1 HR

## 2022-02-21 PROCEDURE — 84550 ASSAY OF BLOOD/URIC ACID: CPT

## 2022-02-21 PROCEDURE — 4A1HXCZ MONITORING OF PRODUCTS OF CONCEPTION, CARDIAC RATE, EXTERNAL APPROACH: ICD-10-PCS | Performed by: OBSTETRICS & GYNECOLOGY

## 2022-02-21 PROCEDURE — 84112 EVAL AMNIOTIC FLUID PROTEIN: CPT | Performed by: MIDWIFE

## 2022-02-21 PROCEDURE — 80053 COMPREHEN METABOLIC PANEL: CPT

## 2022-02-21 PROCEDURE — 84156 ASSAY OF PROTEIN URINE: CPT

## 2022-02-21 PROCEDURE — 86900 BLOOD TYPING SEROLOGIC ABO: CPT

## 2022-02-21 PROCEDURE — 83615 LACTATE (LD) (LDH) ENZYME: CPT

## 2022-02-21 PROCEDURE — 36415 COLL VENOUS BLD VENIPUNCTURE: CPT

## 2022-02-21 PROCEDURE — 65270000029 HC RM PRIVATE

## 2022-02-21 PROCEDURE — 74011000258 HC RX REV CODE- 258: Performed by: MIDWIFE

## 2022-02-21 RX ORDER — BUTORPHANOL TARTRATE 2 MG/ML
2 INJECTION INTRAMUSCULAR; INTRAVENOUS
Status: DISCONTINUED | OUTPATIENT
Start: 2022-02-21 | End: 2022-02-22

## 2022-02-21 RX ORDER — ONDANSETRON 2 MG/ML
4 INJECTION INTRAMUSCULAR; INTRAVENOUS
Status: DISCONTINUED | OUTPATIENT
Start: 2022-02-21 | End: 2022-02-22 | Stop reason: HOSPADM

## 2022-02-21 RX ORDER — TERBUTALINE SULFATE 1 MG/ML
0.25 INJECTION SUBCUTANEOUS AS NEEDED
Status: DISCONTINUED | OUTPATIENT
Start: 2022-02-21 | End: 2022-02-22 | Stop reason: HOSPADM

## 2022-02-21 RX ORDER — OXYTOCIN/RINGER'S LACTATE 30/500 ML
10 PLASTIC BAG, INJECTION (ML) INTRAVENOUS AS NEEDED
Status: DISCONTINUED | OUTPATIENT
Start: 2022-02-21 | End: 2022-02-22

## 2022-02-21 RX ORDER — OXYTOCIN/RINGER'S LACTATE 30/500 ML
87.3 PLASTIC BAG, INJECTION (ML) INTRAVENOUS AS NEEDED
Status: DISCONTINUED | OUTPATIENT
Start: 2022-02-21 | End: 2022-02-24 | Stop reason: HOSPADM

## 2022-02-21 RX ADMIN — PROMETHAZINE HYDROCHLORIDE 12.5 MG: 25 INJECTION INTRAMUSCULAR; INTRAVENOUS at 23:12

## 2022-02-21 RX ADMIN — BUTORPHANOL TARTRATE 2 MG: 2 INJECTION, SOLUTION INTRAMUSCULAR; INTRAVENOUS at 22:36

## 2022-02-21 RX ADMIN — PENICILLIN G POTASSIUM 5 MILLION UNITS: 5000000 INJECTION, POWDER, FOR SOLUTION INTRAMUSCULAR; INTRAVENOUS at 22:36

## 2022-02-22 ENCOUNTER — ANESTHESIA EVENT (OUTPATIENT)
Dept: LABOR AND DELIVERY | Age: 32
End: 2022-02-22
Payer: COMMERCIAL

## 2022-02-22 ENCOUNTER — ANESTHESIA (OUTPATIENT)
Dept: LABOR AND DELIVERY | Age: 32
End: 2022-02-22
Payer: COMMERCIAL

## 2022-02-22 LAB
ABO + RH BLD: NORMAL
BLOOD GROUP ANTIBODIES SERPL: NORMAL
SPECIMEN EXP DATE BLD: NORMAL

## 2022-02-22 PROCEDURE — 36415 COLL VENOUS BLD VENIPUNCTURE: CPT

## 2022-02-22 PROCEDURE — 77030014125 HC TY EPDRL BBMI -B: Performed by: STUDENT IN AN ORGANIZED HEALTH CARE EDUCATION/TRAINING PROGRAM

## 2022-02-22 PROCEDURE — 75410000002 HC LABOR FEE PER 1 HR

## 2022-02-22 PROCEDURE — 74011250636 HC RX REV CODE- 250/636: Performed by: NURSE ANESTHETIST, CERTIFIED REGISTERED

## 2022-02-22 PROCEDURE — 74011000250 HC RX REV CODE- 250: Performed by: ANESTHESIOLOGY

## 2022-02-22 PROCEDURE — 74011000250 HC RX REV CODE- 250: Performed by: STUDENT IN AN ORGANIZED HEALTH CARE EDUCATION/TRAINING PROGRAM

## 2022-02-22 PROCEDURE — 74011000258 HC RX REV CODE- 258: Performed by: MIDWIFE

## 2022-02-22 PROCEDURE — 65410000002 HC RM PRIVATE OB

## 2022-02-22 PROCEDURE — 74011250636 HC RX REV CODE- 250/636: Performed by: OBSTETRICS & GYNECOLOGY

## 2022-02-22 PROCEDURE — 74011000250 HC RX REV CODE- 250: Performed by: OBSTETRICS & GYNECOLOGY

## 2022-02-22 PROCEDURE — 76010000391 HC C SECN FIRST 1 HR: Performed by: OBSTETRICS & GYNECOLOGY

## 2022-02-22 PROCEDURE — 75410000000 HC DELIVERY VAGINAL/SINGLE

## 2022-02-22 PROCEDURE — 75410000003 HC RECOV DEL/VAG/CSECN EA 0.5 HR

## 2022-02-22 PROCEDURE — 76060000078 HC EPIDURAL ANESTHESIA

## 2022-02-22 PROCEDURE — 76010000392 HC C SECN EA ADDL 0.5 HR: Performed by: OBSTETRICS & GYNECOLOGY

## 2022-02-22 PROCEDURE — 74011000250 HC RX REV CODE- 250: Performed by: NURSE ANESTHETIST, CERTIFIED REGISTERED

## 2022-02-22 PROCEDURE — 77030019905 HC CATH URETH INTMIT MDII -A

## 2022-02-22 PROCEDURE — 74011250636 HC RX REV CODE- 250/636: Performed by: STUDENT IN AN ORGANIZED HEALTH CARE EDUCATION/TRAINING PROGRAM

## 2022-02-22 PROCEDURE — 75410000003 HC RECOV DEL/VAG/CSECN EA 0.5 HR: Performed by: OBSTETRICS & GYNECOLOGY

## 2022-02-22 PROCEDURE — 74011250636 HC RX REV CODE- 250/636: Performed by: MIDWIFE

## 2022-02-22 PROCEDURE — 76060000078 HC EPIDURAL ANESTHESIA: Performed by: OBSTETRICS & GYNECOLOGY

## 2022-02-22 PROCEDURE — 74011250636 HC RX REV CODE- 250/636: Performed by: ANESTHESIOLOGY

## 2022-02-22 RX ORDER — EPHEDRINE SULFATE/0.9% NACL/PF 50 MG/5 ML
50 SYRINGE (ML) INTRAVENOUS
Status: DISCONTINUED | OUTPATIENT
Start: 2022-02-22 | End: 2022-02-22 | Stop reason: HOSPADM

## 2022-02-22 RX ORDER — ONDANSETRON 2 MG/ML
4 INJECTION INTRAMUSCULAR; INTRAVENOUS
Status: DISCONTINUED | OUTPATIENT
Start: 2022-02-22 | End: 2022-02-24 | Stop reason: HOSPADM

## 2022-02-22 RX ORDER — MORPHINE SULFATE 10 MG/ML
6 INJECTION, SOLUTION INTRAMUSCULAR; INTRAVENOUS
Status: DISCONTINUED | OUTPATIENT
Start: 2022-02-22 | End: 2022-02-24 | Stop reason: HOSPADM

## 2022-02-22 RX ORDER — KETOROLAC TROMETHAMINE 30 MG/ML
30 INJECTION, SOLUTION INTRAMUSCULAR; INTRAVENOUS
Status: DISPENSED | OUTPATIENT
Start: 2022-02-22 | End: 2022-02-23

## 2022-02-22 RX ORDER — NALOXONE HYDROCHLORIDE 0.4 MG/ML
0.4 INJECTION, SOLUTION INTRAMUSCULAR; INTRAVENOUS; SUBCUTANEOUS AS NEEDED
Status: DISCONTINUED | OUTPATIENT
Start: 2022-02-22 | End: 2022-02-22 | Stop reason: HOSPADM

## 2022-02-22 RX ORDER — BUPIVACAINE HYDROCHLORIDE 5 MG/ML
INJECTION, SOLUTION EPIDURAL; INTRACAUDAL
Status: COMPLETED
Start: 2022-02-22 | End: 2022-02-22

## 2022-02-22 RX ORDER — IBUPROFEN 400 MG/1
800 TABLET ORAL EVERY 8 HOURS
Status: DISCONTINUED | OUTPATIENT
Start: 2022-02-22 | End: 2022-02-24 | Stop reason: HOSPADM

## 2022-02-22 RX ORDER — OXYTOCIN/RINGER'S LACTATE 30/500 ML
10 PLASTIC BAG, INJECTION (ML) INTRAVENOUS AS NEEDED
Status: DISCONTINUED | OUTPATIENT
Start: 2022-02-22 | End: 2022-02-24 | Stop reason: HOSPADM

## 2022-02-22 RX ORDER — OXYCODONE AND ACETAMINOPHEN 5; 325 MG/1; MG/1
1 TABLET ORAL
Status: DISCONTINUED | OUTPATIENT
Start: 2022-02-22 | End: 2022-02-24 | Stop reason: HOSPADM

## 2022-02-22 RX ORDER — DIPHENHYDRAMINE HYDROCHLORIDE 50 MG/ML
25 INJECTION, SOLUTION INTRAMUSCULAR; INTRAVENOUS
Status: DISCONTINUED | OUTPATIENT
Start: 2022-02-22 | End: 2022-02-22 | Stop reason: HOSPADM

## 2022-02-22 RX ORDER — AMMONIA 15 % (W/V)
1 AMPUL (EA) INHALATION AS NEEDED
Status: DISCONTINUED | OUTPATIENT
Start: 2022-02-22 | End: 2022-02-24 | Stop reason: HOSPADM

## 2022-02-22 RX ORDER — OXYTOCIN/RINGER'S LACTATE 30/500 ML
87.3 PLASTIC BAG, INJECTION (ML) INTRAVENOUS AS NEEDED
Status: DISCONTINUED | OUTPATIENT
Start: 2022-02-22 | End: 2022-02-24 | Stop reason: HOSPADM

## 2022-02-22 RX ORDER — HYDROCORTISONE 1 %
CREAM (GRAM) TOPICAL AS NEEDED
Status: DISCONTINUED | OUTPATIENT
Start: 2022-02-22 | End: 2022-02-24 | Stop reason: HOSPADM

## 2022-02-22 RX ORDER — MORPHINE SULFATE 10 MG/ML
10 INJECTION, SOLUTION INTRAMUSCULAR; INTRAVENOUS
Status: DISCONTINUED | OUTPATIENT
Start: 2022-02-22 | End: 2022-02-24 | Stop reason: HOSPADM

## 2022-02-22 RX ORDER — SODIUM CHLORIDE, SODIUM LACTATE, POTASSIUM CHLORIDE, CALCIUM CHLORIDE 600; 310; 30; 20 MG/100ML; MG/100ML; MG/100ML; MG/100ML
150 INJECTION, SOLUTION INTRAVENOUS CONTINUOUS
Status: DISCONTINUED | OUTPATIENT
Start: 2022-02-22 | End: 2022-02-22 | Stop reason: HOSPADM

## 2022-02-22 RX ORDER — FENTANYL/BUPIVACAINE/NS/PF 2-1250MCG
1-16 PREFILLED PUMP RESERVOIR EPIDURAL CONTINUOUS
Status: DISCONTINUED | OUTPATIENT
Start: 2022-02-22 | End: 2022-02-22 | Stop reason: HOSPADM

## 2022-02-22 RX ORDER — LIDOCAINE HYDROCHLORIDE AND EPINEPHRINE 15; 5 MG/ML; UG/ML
INJECTION, SOLUTION EPIDURAL
Status: COMPLETED | OUTPATIENT
Start: 2022-02-22 | End: 2022-02-22

## 2022-02-22 RX ORDER — ONDANSETRON 2 MG/ML
INJECTION INTRAMUSCULAR; INTRAVENOUS AS NEEDED
Status: DISCONTINUED | OUTPATIENT
Start: 2022-02-22 | End: 2022-02-22 | Stop reason: HOSPADM

## 2022-02-22 RX ORDER — DOCUSATE SODIUM 100 MG/1
100 CAPSULE, LIQUID FILLED ORAL
Status: DISCONTINUED | OUTPATIENT
Start: 2022-02-22 | End: 2022-02-24 | Stop reason: HOSPADM

## 2022-02-22 RX ORDER — NITROGLYCERIN 20 MG/100ML
INJECTION INTRAVENOUS AS NEEDED
Status: DISCONTINUED | OUTPATIENT
Start: 2022-02-22 | End: 2022-02-22 | Stop reason: HOSPADM

## 2022-02-22 RX ORDER — MORPHINE SULFATE 0.5 MG/ML
INJECTION, SOLUTION EPIDURAL; INTRATHECAL; INTRAVENOUS AS NEEDED
Status: DISCONTINUED | OUTPATIENT
Start: 2022-02-22 | End: 2022-02-22 | Stop reason: HOSPADM

## 2022-02-22 RX ORDER — ONDANSETRON 4 MG/1
4 TABLET, ORALLY DISINTEGRATING ORAL
Status: DISCONTINUED | OUTPATIENT
Start: 2022-02-22 | End: 2022-02-24 | Stop reason: HOSPADM

## 2022-02-22 RX ORDER — NALOXONE HYDROCHLORIDE 0.4 MG/ML
0.4 INJECTION, SOLUTION INTRAMUSCULAR; INTRAVENOUS; SUBCUTANEOUS AS NEEDED
Status: DISCONTINUED | OUTPATIENT
Start: 2022-02-22 | End: 2022-02-24 | Stop reason: HOSPADM

## 2022-02-22 RX ORDER — SIMETHICONE 80 MG
80 TABLET,CHEWABLE ORAL
Status: DISCONTINUED | OUTPATIENT
Start: 2022-02-22 | End: 2022-02-24 | Stop reason: HOSPADM

## 2022-02-22 RX ORDER — LIDOCAINE HYDROCHLORIDE AND EPINEPHRINE 20; 5 MG/ML; UG/ML
INJECTION, SOLUTION EPIDURAL; INFILTRATION; INTRACAUDAL; PERINEURAL AS NEEDED
Status: DISCONTINUED | OUTPATIENT
Start: 2022-02-22 | End: 2022-02-22 | Stop reason: HOSPADM

## 2022-02-22 RX ORDER — DIPHENHYDRAMINE HYDROCHLORIDE 50 MG/ML
12.5 INJECTION, SOLUTION INTRAMUSCULAR; INTRAVENOUS
Status: DISCONTINUED | OUTPATIENT
Start: 2022-02-22 | End: 2022-02-24 | Stop reason: HOSPADM

## 2022-02-22 RX ORDER — BUPIVACAINE HYDROCHLORIDE 5 MG/ML
INJECTION, SOLUTION EPIDURAL; INTRACAUDAL
Status: COMPLETED | OUTPATIENT
Start: 2022-02-22 | End: 2022-02-22

## 2022-02-22 RX ORDER — ACETAMINOPHEN 325 MG/1
650 TABLET ORAL
Status: DISCONTINUED | OUTPATIENT
Start: 2022-02-22 | End: 2022-02-24 | Stop reason: HOSPADM

## 2022-02-22 RX ORDER — DEXAMETHASONE SODIUM PHOSPHATE 4 MG/ML
INJECTION, SOLUTION INTRA-ARTICULAR; INTRALESIONAL; INTRAMUSCULAR; INTRAVENOUS; SOFT TISSUE AS NEEDED
Status: DISCONTINUED | OUTPATIENT
Start: 2022-02-22 | End: 2022-02-22 | Stop reason: HOSPADM

## 2022-02-22 RX ORDER — SODIUM CHLORIDE 0.9 % (FLUSH) 0.9 %
5-40 SYRINGE (ML) INJECTION EVERY 8 HOURS
Status: DISCONTINUED | OUTPATIENT
Start: 2022-02-22 | End: 2022-02-24 | Stop reason: HOSPADM

## 2022-02-22 RX ORDER — SODIUM CHLORIDE 0.9 % (FLUSH) 0.9 %
5-40 SYRINGE (ML) INJECTION AS NEEDED
Status: DISCONTINUED | OUTPATIENT
Start: 2022-02-22 | End: 2022-02-24 | Stop reason: HOSPADM

## 2022-02-22 RX ORDER — KETOROLAC TROMETHAMINE 30 MG/ML
INJECTION, SOLUTION INTRAMUSCULAR; INTRAVENOUS AS NEEDED
Status: DISCONTINUED | OUTPATIENT
Start: 2022-02-22 | End: 2022-02-22 | Stop reason: HOSPADM

## 2022-02-22 RX ORDER — FENTANYL CITRATE 50 UG/ML
INJECTION, SOLUTION INTRAMUSCULAR; INTRAVENOUS AS NEEDED
Status: DISCONTINUED | OUTPATIENT
Start: 2022-02-22 | End: 2022-02-22 | Stop reason: HOSPADM

## 2022-02-22 RX ORDER — SODIUM CHLORIDE, SODIUM LACTATE, POTASSIUM CHLORIDE, CALCIUM CHLORIDE 600; 310; 30; 20 MG/100ML; MG/100ML; MG/100ML; MG/100ML
125 INJECTION, SOLUTION INTRAVENOUS CONTINUOUS
Status: DISCONTINUED | OUTPATIENT
Start: 2022-02-22 | End: 2022-02-24 | Stop reason: HOSPADM

## 2022-02-22 RX ORDER — OXYTOCIN/RINGER'S LACTATE 30/500 ML
87.3 PLASTIC BAG, INJECTION (ML) INTRAVENOUS AS NEEDED
Status: COMPLETED | OUTPATIENT
Start: 2022-02-22 | End: 2022-02-22

## 2022-02-22 RX ADMIN — SODIUM CHLORIDE 2.5 MILLION UNITS: 9 INJECTION, SOLUTION INTRAVENOUS at 10:22

## 2022-02-22 RX ADMIN — Medication 909 ML/HR: at 13:02

## 2022-02-22 RX ADMIN — SODIUM CHLORIDE 2.5 MILLION UNITS: 9 INJECTION, SOLUTION INTRAVENOUS at 01:53

## 2022-02-22 RX ADMIN — ONDANSETRON HYDROCHLORIDE 4 MG: 2 INJECTION, SOLUTION INTRAMUSCULAR; INTRAVENOUS at 12:38

## 2022-02-22 RX ADMIN — Medication 10 ML/HR: at 01:49

## 2022-02-22 RX ADMIN — LIDOCAINE HYDROCHLORIDE,EPINEPHRINE BITARTRATE 4.5 ML: 15; .005 INJECTION, SOLUTION EPIDURAL; INFILTRATION; INTRACAUDAL; PERINEURAL at 01:35

## 2022-02-22 RX ADMIN — KETOROLAC TROMETHAMINE 30 MG: 30 INJECTION, SOLUTION INTRAMUSCULAR; INTRAVENOUS at 13:24

## 2022-02-22 RX ADMIN — WATER 3 G: 1 INJECTION INTRAMUSCULAR; INTRAVENOUS; SUBCUTANEOUS at 12:40

## 2022-02-22 RX ADMIN — BUPIVACAINE HYDROCHLORIDE 30 MG: 5 INJECTION, SOLUTION EPIDURAL; INTRACAUDAL; PERINEURAL at 01:35

## 2022-02-22 RX ADMIN — KETOROLAC TROMETHAMINE 30 MG: 30 INJECTION, SOLUTION INTRAMUSCULAR; INTRAVENOUS at 19:50

## 2022-02-22 RX ADMIN — SODIUM CHLORIDE, POTASSIUM CHLORIDE, SODIUM LACTATE AND CALCIUM CHLORIDE 125 ML/HR: 600; 310; 30; 20 INJECTION, SOLUTION INTRAVENOUS at 19:45

## 2022-02-22 RX ADMIN — NITROGLYCERIN 80 MCG: 20 INJECTION INTRAVENOUS at 12:59

## 2022-02-22 RX ADMIN — DEXAMETHASONE SODIUM PHOSPHATE 4 MG: 4 INJECTION, SOLUTION INTRAMUSCULAR; INTRAVENOUS at 12:38

## 2022-02-22 RX ADMIN — AZITHROMYCIN MONOHYDRATE 500 MG: 500 INJECTION, POWDER, LYOPHILIZED, FOR SOLUTION INTRAVENOUS at 12:29

## 2022-02-22 RX ADMIN — Medication 10 ML/HR: at 10:32

## 2022-02-22 RX ADMIN — SODIUM CHLORIDE 2.5 MILLION UNITS: 9 INJECTION, SOLUTION INTRAVENOUS at 06:15

## 2022-02-22 RX ADMIN — SODIUM CHLORIDE, POTASSIUM CHLORIDE, SODIUM LACTATE AND CALCIUM CHLORIDE 150 ML/HR: 600; 310; 30; 20 INJECTION, SOLUTION INTRAVENOUS at 01:54

## 2022-02-22 RX ADMIN — LIDOCAINE HYDROCHLORIDE,EPINEPHRINE BITARTRATE 10 ML: 20; .005 INJECTION, SOLUTION EPIDURAL; INFILTRATION; INTRACAUDAL; PERINEURAL at 11:35

## 2022-02-22 RX ADMIN — FENTANYL CITRATE 100 MCG: 50 INJECTION, SOLUTION INTRAMUSCULAR; INTRAVENOUS at 12:40

## 2022-02-22 RX ADMIN — Medication 3 MG: at 13:09

## 2022-02-22 NOTE — PROGRESS NOTES
In room to see patient after 2.5 hrs of pushing. Still feeling LBP and window on LLQ. Visit Vitals  /78   Pulse 86   Temp 98.4 °F (36.9 °C)   Resp 16   Ht 5' 6\" (1.676 m)   Wt 119.7 kg (264 lb)   SpO2 94%   BMI 42.61 kg/m²     SVE: C/C/+1  EFM: Cat 2 during pushing, Cat 1 once stopped  CTX: q2-3 mins    A/P: Pt is a 33 yo  at 39+2 wga with arrest of descent    - Minimal descent after 2+ hrs of pushing with attempts to manually rotate both clockwise and counterclockwise. Baby's head feels asynclitic and significant caput is noted. - Recommend CS at this point given lack of progress. Reviewed risks including but not limited to anesthesia, infection, bleeding (possibly requiring blood transfusion), and damage to nearby organs, including but not limited to bowel and bladder. All patient question answered and she desires to proceed with . Consent signed. - Given 2nd stage arrest will plan to use fetal pillow.

## 2022-02-22 NOTE — ANESTHESIA POSTPROCEDURE EVALUATION
Post-Anesthesia Evaluation and Assessment    Patient: Jo-Ann Mckeon MRN: [id number]  SSN: [id number]    YOB: 1990  Age: 34 y.o. Sex: female      I have evaluated the patient and they are stable and ready for discharge from the PACU. Cardiovascular Function/Vital Signs  Visit Vitals  /75   Pulse 85   Temp 36.7 °C (98 °F)   Resp 16   Ht 5' 6\" (1.676 m)   Wt 119.7 kg (264 lb)   SpO2 95%   Breastfeeding Unknown   BMI 42.61 kg/m²       Patient is status post Epidural anesthesia for Procedure(s):   SECTION. Nausea/Vomiting: None    Postoperative hydration reviewed and adequate. Pain:  Pain Scale 1: Numeric (0 - 10) (22 1345)  Pain Intensity 1: 0 (22 1345)   Managed    Neurological Status: At baseline    Mental Status, Level of Consciousness: Alert and  oriented to person, place, and time    Pulmonary Status:   O2 Device: None (22 1336)   Adequate oxygenation and airway patent    Complications related to anesthesia: None    Post-anesthesia assessment completed.  No concerns    Signed By: Mj Summers MD     2022

## 2022-02-22 NOTE — PROGRESS NOTES
0761 Bedside report received from Christal Abreu RN and Kyra Miguel RN. Pt resting, reports feeling slight contractions again in low abdomen and back. Will continue to monitor and encourage use of PCEA.     [id number] Dr Kimberly Wills at bedside, SVE 10/100. Plan to start pushing. [facility] Dr Kimberly Wills at bedside, assessing pushing. [facility] Dr Kimberly Wills at bedside, assessing progress, discussing plan of care and recommending C/S- pt and  agreeable to c/s.

## 2022-02-22 NOTE — H&P
G1 @ 39+1 admitted in labor with c/o SROM clear fluid at 1830. She continues to leak fluid. Amnisure positive. Since arrival her contractions have intensified.      BP elevated on arrival. Denies any hx of high blood pressure. Denies HA, vision changes, RUQ pain.      Her  had a Covid exposure at work 2 weeks ago. She began have sinus congestion sx on 02/09 into 02/10. She had a negative test at home, but her 38w routine pre-admission testing was positive 02/17. Her  then also tested positive, but has no sx. She is vaccinated and boosted.     Pt of Dr Yoli Blunt. LGA: 92% 7# 12oz @ 36w  GBS pos  Anemia: on iron  Single echogenic focus and dilated renal pelvis, with wnl f/u sonos, declined Mat 21                 Past Medical History   No past medical history on file.        Past Surgical History   No past surgical history on file. No family history on file.              Socioeconomic History    Marital status: SINGLE                  ALLERGIES: Patient has no known allergies.     Review of Systems   Constitutional: Negative. Negative for chills and fever. HENT: Positive for sinus pressure and sinus pain. Eyes: Negative. Negative for photophobia and visual disturbance. Respiratory: Negative. Negative for cough and shortness of breath. Cardiovascular: Negative. Negative for leg swelling. Gastrointestinal: Negative. Negative for constipation, diarrhea, nausea and vomiting. Endocrine: Negative. Genitourinary: Negative. Negative for dysuria, hematuria and vaginal bleeding. Musculoskeletal: Negative. Skin: Negative. Allergic/Immunologic: Negative. Neurological: Negative. Negative for dizziness, light-headedness and headaches. Hematological: Negative.     Psychiatric/Behavioral: Negative.                 Vitals:     02/21/22 2015 02/21/22 2025 02/21/22 2202 02/21/22 2203   BP: (!) 144/104 (!) 154/97 (!) 140/96     Pulse: 92 94 93     Resp:     18     Temp:     98.2 °F (36.8 °C)     SpO2:           Weight:       119.7 kg (264 lb)   Height:       5' 6\" (1.676 m)             Physical Exam  Constitutional:       Appearance: Normal appearance. She is obese. HENT:      Head: Normocephalic. Cardiovascular:      Rate and Rhythm: Normal rate. Pulmonary:      Effort: Pulmonary effort is normal.   Abdominal:      Palpations: Abdomen is soft. Tenderness: There is no abdominal tenderness. Genitourinary:     General: Normal vulva. Rectum: Normal.      Comments: 3/80/-3  Musculoskeletal:         General: Normal range of motion. Cervical back: Normal range of motion. Skin:     General: Skin is warm and dry. Neurological:      General: No focal deficit present. Mental Status: She is alert and oriented to person, place, and time. Psychiatric:         Mood and Affect: Mood normal.         Behavior: Behavior normal.                         G1 @ 39+1 ruptured with clear fluid and in early labor  1. Admit to L&D  2. Start GBS ppx  3. CEFM  4. Plans epidural  5. Pt is Covid positive, now on Day 11 since first sx  6.  Send [address] labs

## 2022-02-22 NOTE — ED PROVIDER NOTES
G1 @ 39+1 arrives to [facility] with c/o SROM clear fluid at 1830. She continues to leak fluid. Amnisure positive. Since arrival her contractions have intensified. BP elevated on arrival. Denies any hx of high blood pressure. Denies HA, vision changes, RUQ pain. Her  had a Covid exposure at work 2 weeks ago. She began have sinus congestion sx on 02/09 into 02/10. She had a negative test at home, but her 38w routine pre-admission testing was positive 02/17. Her  then also tested positive, but has no sx. She is vaccinated and boosted. Pt of Dr Rubi Luther. LGA: 92% 7# 12oz @ 36w  GBS pos  Anemia: on iron  Single echogenic focus and dilated renal pelvis, with wnl f/u sonos, declined Mat 21             No past medical history on file. No past surgical history on file. No family history on file. Social History     Socioeconomic History    Marital status: SINGLE     Spouse name: Not on file    Number of children: Not on file    Years of education: Not on file    Highest education level: Not on file   Occupational History    Not on file   Tobacco Use    Smoking status: Not on file    Smokeless tobacco: Not on file   Substance and Sexual Activity    Alcohol use: Not on file    Drug use: Not on file    Sexual activity: Not on file   Other Topics Concern    Not on file   Social History Narrative    Not on file     Social Determinants of Health     Financial Resource Strain:     Difficulty of Paying Living Expenses: Not on file   Food Insecurity:     Worried About Running Out of Food in the Last Year: Not on file    Johnny of Food in the Last Year: Not on file   Transportation Needs:     Lack of Transportation (Medical): Not on file    Lack of Transportation (Non-Medical):  Not on file   Physical Activity:     Days of Exercise per Week: Not on file    Minutes of Exercise per Session: Not on file   Stress:     Feeling of Stress : Not on file   Social Connections:     Frequency of Communication with Friends and Family: Not on file    Frequency of Social Gatherings with Friends and Family: Not on file    Attends Buddhism Services: Not on file    Active Member of Clubs or Organizations: Not on file    Attends Club or Organization Meetings: Not on file    Marital Status: Not on file   Intimate Partner Violence:     Fear of Current or Ex-Partner: Not on file    Emotionally Abused: Not on file    Physically Abused: Not on file    Sexually Abused: Not on file   Housing Stability:     Unable to Pay for Housing in the Last Year: Not on file    Number of [place] in the Last Year: Not on file    Unstable Housing in the Last Year: Not on file         ALLERGIES: Patient has no known allergies. Review of Systems   Constitutional: Negative. Negative for chills and fever. HENT: Positive for sinus pressure and sinus pain. Eyes: Negative. Negative for photophobia and visual disturbance. Respiratory: Negative. Negative for cough and shortness of breath. Cardiovascular: Negative. Negative for leg swelling. Gastrointestinal: Negative. Negative for constipation, diarrhea, nausea and vomiting. Endocrine: Negative. Genitourinary: Negative. Negative for dysuria, hematuria and vaginal bleeding. Musculoskeletal: Negative. Skin: Negative. Allergic/Immunologic: Negative. Neurological: Negative. Negative for dizziness, light-headedness and headaches. Hematological: Negative. Psychiatric/Behavioral: Negative. Vitals:    02/21/22 2015 02/21/22 2025 02/21/22 2202 02/21/22 2203   BP: (!) 144/104 (!) 154/97 (!) 140/96    Pulse: 92 94 93    Resp:   18    Temp:   98.2 °F (36.8 °C)    SpO2:       Weight:    119.7 kg (264 lb)   Height:    5' 6\" (1.676 m)            Physical Exam  Constitutional:       Appearance: Normal appearance. She is obese. HENT:      Head: Normocephalic. Cardiovascular:      Rate and Rhythm: Normal rate.    Pulmonary:      Effort: Pulmonary effort is normal.   Abdominal:      Palpations: Abdomen is soft. Tenderness: There is no abdominal tenderness. Genitourinary:     General: Normal vulva. Rectum: Normal.      Comments: 3/80/-3  Musculoskeletal:         General: Normal range of motion. Cervical back: Normal range of motion. Skin:     General: Skin is warm and dry. Neurological:      General: No focal deficit present. Mental Status: She is alert and oriented to person, place, and time. Psychiatric:         Mood and Affect: Mood normal.         Behavior: Behavior normal.          MDM  Number of Diagnoses or Management Options  Risk of Complications, Morbidity, and/or Mortality  Presenting problems: moderate  Diagnostic procedures: moderate  Management options: moderate    Critical Care  Total time providing critical care:  minutes    Patient Progress  Patient progress: stable    ED Course as of 02/21/22 2240   Mon Feb 21, 2022   2119 G1 @ 39+1 ruptured with clear fluid and in early labor  1. Admit to L&D  2. Start GBS ppx  3. CEFM  4. Plans epidural  5. Pt is Covid positive, now on Day 11 since first sx  6.  Send [address] labs [EF]      ED Course User Index  [EF] Fritz Patton CNM       Procedures

## 2022-02-22 NOTE — PROGRESS NOTES
In room to see patient.   Comfortable with epidural.    Visit Vitals  /78   Pulse 86   Temp 98.4 °F (36.9 °C)   Resp 16   Ht 5' 6\" (1.676 m)   Wt 119.7 kg (264 lb)   SpO2 94%   BMI 42.61 kg/m²     SVE: C/C/-1  EFM: Cat 1  [place]: q2-4 mins    A/P: Pt is a 35 yo  at 39+2 presenting with SROM  - Feels OP --> will spin  - Cont GBS ppx  - COVID +  - Will plan to start pushing after spinning

## 2022-02-22 NOTE — ANESTHESIA PROCEDURE NOTES
Epidural Block    Patient location during procedure: OB  Start time: 2/22/2022 1:35 AM  End time: 2/22/2022 1:45 AM  Reason for block: labor epidural  Staffing  Performed: attending   Anesthesiologist: Jermain Castillo DO  Preanesthetic Checklist  Completed: patient identified, IV checked, site marked, risks and benefits discussed, surgical consent, monitors and equipment checked, pre-op evaluation and timeout performed  Block Placement  Patient position: sitting  Prep: DuraPrep  Sterility prep: mask, gloves, cap and drape  Sedation level: no sedation  Patient monitoring: heart rate, frequent blood pressure checks and continuous pulse oximetry  Approach: midline  Location: lumbar  Lumbar location: L3-L4  Epidural  Loss of resistance technique: air  Guidance: landmark technique  Needle  Needle type: Tuohy   Needle gauge: 17 G  Needle length: 9 cm  Needle insertion depth: 6 cm  Catheter type: stylet  Catheter size: 19 G  Catheter at skin depth: 11 cm  Catheter securement method: clear occlusive dressing, liquid medical adhesive and surgical tape  Test dose: negative  Medications Administered  Bupivacaine (PF) (MARCAINE) 0.5 % (5 mg/mL) Epidural, 30 mg  lidocaine-EPINEPHrine (XYLOCAINE) 1.5 %-1:200,000 Epidural, 4.5 mL  Assessment  Sensory level: T6  Block outcome: pain improved  Number of attempts: 1  Procedure assessment: patient tolerated procedure well with no immediate complications

## 2022-02-22 NOTE — OP NOTES
Operative Note    Name: Mickie Chapa Record Number: [id number]   YOB: 1990  Today's Date: 2022      Pre-operative Diagnosis: Malposition,Arrest of Descent    Post-operative Diagnosis: Same    Operation: low transverse  section with T incision. Procedure(s):   SECTION    Surgeon(s):  MD Boogie Esteban MD    Anesthesia: Epidural    Prophylactic Antibiotics: Ancef and azithromycin  DVT Prophylaxis: Sequential Compression Devices         Fetal Description: peña     Birth Information:   Information for the patient's :  Jose J Fisher [[id number]]   Delivery of a   male infant on 2022 at 1:01 PM. Apgars were 1  and 8 . Umbilical Cord: 3 Vessels     Umbilical Cord Events: None     Placenta: Manual Removal removal with Normal appearance. Amniotic Fluid Volume: Moderate     Amniotic Fluid Description:  Clear        Umbilical Cord: 3 vessels present    Placenta:  expressed    Estimated Blood Loss (ml):  1000mL    Specimens: Cord blood for gases -> pH 6.993           Complications:  Deeply impacted vertex    Procedure Detail:      After proper patient identification and consent, the patient was taken to the operating room, where epidural anesthesia was administered and found to be adequate. Vinson catheter had been placed using sterile technique. The patient was prepped and draped in the normal sterile fashion. The abdomen was entered using the Pfannenstiel technique and carried down to the fascia. The fascia was incised sharply and extended laterally. The inferior fascial edge was grasped with two Kocher clamps and sharply dissected off of the rectus muscles. The superior aspect of the fascial edge was then grasped with two Kochers and and the superior rectus was sharply dissected off of the fascial edge. Following this the rectus muscles were  in the midline.  The peritoneum was entered bluntly well superior to the bladder without any apparent injury and stretched bilaterally. An Jim retractor was placed into the abdomen. The bladder flap was created without difficulty. A low transverse uterine incision was made with the scalpel and extended superiorly and inferiorly with blunt finger dissection. Amniotomy was performed and the fluid was small amount clear. The head was noted to be deeply impacted and unable to be delivered initially. The baby's shoulders were elevated in an attempt to elevate the head, this was unsuccessful. The Jim retractor was removed. An attempt was made at delivering the breech, this was unsuccessful. A hand from below was utilized to elevate the head and initially this was not successful. Nitrogylcerin was given to help relax the uterus. The incision was extended cephalad in the midline creating a T to provide more room. Finally a second hand from below was able to break the suction and the baby's head was delivered through the hysterotomy atraumatically. The nose and mouth were suctioned. The cord was clamped and cut and the baby was handed off to  staff in attendance. A cord segment was obtained for gases. Placenta was then removed from the uterus. The uterus was exteriorized. The uterus was curettaged with a dry lap pad three times and cleared of all clots and debris. The uterine incision was closed with 0 monocryl, double layer  in running locking fashion for the first layer and non-locking for the second layer with good hemostasis assured. A small extension along the right angle was noted and repaired in a running locked fashion and the T was also reapproximated with 0 monocryl in a running locked fashion. Once the two layers were closed good hemostasis was again reassured and the uterus was returned to the abdomen. The paracolic gutters were cleaned with moist laparotomy pads and good hemostasis was again reassured throughout.   Surgicel was put over the incision. The fascia was closed with 0 Vicryl in a running fashion. Good hemostasis was assured and Surgicel was placed over the subcutaneous tissue which was then closed in an interrupted fashion with 2-0 plain. The skin was closed with a subcuticular staple closure. The patient tolerated the procedure well. Sponge, lap, and needle counts were correct times three and the patient and baby were taken to recovery/postpartum room in stable condition. The patient was advised not to .       Latricia Small MD  2022  2:10 PM

## 2022-02-22 NOTE — ANESTHESIA PREPROCEDURE EVALUATION
Relevant Problems   No relevant active problems       Anesthetic History   No history of anesthetic complications            Review of Systems / Medical History  Patient summary reviewed, nursing notes reviewed and pertinent labs reviewed    Pulmonary  Within defined limits                 Neuro/Psych   Within defined limits           Cardiovascular  Within defined limits                     GI/Hepatic/Renal  Within defined limits              Endo/Other        Morbid obesity and anemia     Other Findings              Physical Exam    Airway  Mallampati: II  TM Distance: 4 - 6 cm  Neck ROM: normal range of motion   Mouth opening: Normal     Cardiovascular    Rhythm: regular  Rate: normal         Dental  No notable dental hx       Pulmonary  Breath sounds clear to auscultation               Abdominal  GI exam deferred       Other Findings            Anesthetic Plan    ASA: 3  Anesthesia type: epidural          Induction: Intravenous  Anesthetic plan and risks discussed with: Patient

## 2022-02-22 NOTE — ROUTINE PROCESS
2145: SBAR received from [address]    0606-0329: RN at bedside continuously adjusting monitors. Fetal movement audible    [id number] Dr Misty Ferreira at bedside for epidural placement. [place] off for epidural placement    0200: R sidelying    0410: L sidelying    0635: R sidelying    0740: Bedside shift change report given to Leeanne Frankel RN (oncoming nurse) by Tim Castillo RN (offgoing nurse). Report included the following information SBAR.

## 2022-02-22 NOTE — ED TRIAGE NOTES
6163 Patient arrived for rule out SROM, patient reports she felt a gush at 1830 of \"milky fluid\" but showered and stated it was clear following that. Patient denies contractions, reports appropriate fetal movement. Denies any complications with her pregnancy. EFM applied, history obtained. Nitrazine negative. Serial Bps started for elevated blood pressure. CNM notified patient arrived. Patient had a positive COVID test resulted 02/16/2022, currently asymptomatic. 2015 CNM assessing fetal strip, ordered amnisure ROM test to be done at bedside. 2025 Amnisure positive.

## 2022-02-23 LAB
BASOPHILS # BLD: 0 K/UL (ref 0–0.1)
BASOPHILS NFR BLD: 0 % (ref 0–1)
DIFFERENTIAL METHOD BLD: ABNORMAL
EOSINOPHIL # BLD: 0 K/UL (ref 0–0.4)
EOSINOPHIL NFR BLD: 0 % (ref 0–7)
ERYTHROCYTE [DISTWIDTH] IN BLOOD BY AUTOMATED COUNT: 14.1 % (ref 11.5–14.5)
HCT VFR BLD AUTO: 24.3 % (ref 35–47)
HGB BLD-MCNC: 7.9 G/DL (ref 11.5–16)
IMM GRANULOCYTES # BLD AUTO: 0.1 K/UL (ref 0–0.04)
IMM GRANULOCYTES NFR BLD AUTO: 1 % (ref 0–0.5)
LYMPHOCYTES # BLD: 1.8 K/UL (ref 0.8–3.5)
LYMPHOCYTES NFR BLD: 10 % (ref 12–49)
MCH RBC QN AUTO: 28.4 PG (ref 26–34)
MCHC RBC AUTO-ENTMCNC: 32.5 G/DL (ref 30–36.5)
MCV RBC AUTO: 87.4 FL (ref 80–99)
MONOCYTES # BLD: 0.7 K/UL (ref 0–1)
MONOCYTES NFR BLD: 4 % (ref 5–13)
NEUTS SEG # BLD: 14.8 K/UL (ref 1.8–8)
NEUTS SEG NFR BLD: 85 % (ref 32–75)
NRBC # BLD: 0 K/UL (ref 0–0.01)
NRBC BLD-RTO: 0 PER 100 WBC
PLATELET # BLD AUTO: 198 K/UL (ref 150–400)
PMV BLD AUTO: 11.1 FL (ref 8.9–12.9)
RBC # BLD AUTO: 2.78 M/UL (ref 3.8–5.2)
WBC # BLD AUTO: 17.4 K/UL (ref 3.6–11)

## 2022-02-23 PROCEDURE — 65410000002 HC RM PRIVATE OB

## 2022-02-23 PROCEDURE — 74011250637 HC RX REV CODE- 250/637: Performed by: OBSTETRICS & GYNECOLOGY

## 2022-02-23 PROCEDURE — 85025 COMPLETE CBC W/AUTO DIFF WBC: CPT

## 2022-02-23 PROCEDURE — 36415 COLL VENOUS BLD VENIPUNCTURE: CPT

## 2022-02-23 PROCEDURE — 74011250636 HC RX REV CODE- 250/636: Performed by: ANESTHESIOLOGY

## 2022-02-23 RX ORDER — OXYCODONE HYDROCHLORIDE 5 MG/1
5 TABLET ORAL
Qty: 18 TABLET | Refills: 0 | Status: SHIPPED | OUTPATIENT
Start: 2022-02-23 | End: 2022-02-28

## 2022-02-23 RX ORDER — IBUPROFEN 800 MG/1
800 TABLET ORAL
Qty: 40 TABLET | Refills: 1 | Status: SHIPPED | OUTPATIENT
Start: 2022-02-23

## 2022-02-23 RX ADMIN — ACETAMINOPHEN 650 MG: 325 TABLET ORAL at 16:03

## 2022-02-23 RX ADMIN — ACETAMINOPHEN 650 MG: 325 TABLET ORAL at 20:59

## 2022-02-23 RX ADMIN — KETOROLAC TROMETHAMINE 30 MG: 30 INJECTION, SOLUTION INTRAMUSCULAR; INTRAVENOUS at 03:00

## 2022-02-23 RX ADMIN — SIMETHICONE 80 MG: 80 TABLET, CHEWABLE ORAL at 20:59

## 2022-02-23 RX ADMIN — IBUPROFEN 800 MG: 400 TABLET, FILM COATED ORAL at 16:03

## 2022-02-23 RX ADMIN — DOCUSATE SODIUM 100 MG: 100 CAPSULE, LIQUID FILLED ORAL at 20:59

## 2022-02-23 RX ADMIN — MORPHINE SULFATE 6 MG: 10 INJECTION INTRAVENOUS at 00:10

## 2022-02-23 RX ADMIN — KETOROLAC TROMETHAMINE 30 MG: 30 INJECTION, SOLUTION INTRAMUSCULAR; INTRAVENOUS at 08:45

## 2022-02-23 NOTE — LACTATION NOTE
This note was copied from a baby's chart. Initial Lactation Consultation: Infant born yesterday afternoon to a  mom at 44 2/7 weeks gestation. Mom noted breast changes during the pregnancy. Observed infant at breast, mom using a nipple shield. Infant sucking consistently with deep latch. Discussed normal feeding patterns and behaviors. Feeding Plan: Mother will keep baby skin to skin as often as possible, feed on demand, 8-12x/day , respond to feeding cues, obtain latch, listen for audible swallowing, be aware of signs of oxytocin release/ cramping,thirst,sleepiness while breastfeeding, offer both breasts,and will not limit feedings. Mother agrees to utilize breast massage while nursing to facilitate lactogenesis.

## 2022-02-23 NOTE — PROGRESS NOTES
Delivery Summary  Patient: Donnie Case             Circumcision:   desires  Additional Delivery Comments - 1LTCS for SSA after pushing for 2 hours. Deeply impacted vertex. Hand from below needed to elevate. Uterine incision T'd. Right extension down toward the cervix. TOLAC contraindicated. Information for the patient's :  Catilin Liu [[id number]]     Delivery Type: , Low Transverse   Delivery Date: 2022   Delivery Time: 1:01 PM     Birth Weight: 3.827 kg     Sex:  male  Delivery Clinician:  Narinder Barbour   Gestational Age: 44w2d    Presentation: Vertex   Position:             Apgars were 1  and 8      Resuscitation Method: Suctioning-bulb; Tactile Stimulation     Meconium Stained:      Living Status: Living       Placenta Date/Time: 2022  1:02 PM   Placenta Removal: Manual Removal   Placenta Appearance: Normal    Cord Information: 3 Vessels    Cord Events: None       Disposition of Cord Blood: Lab    Blood Gases Sent?:  Yes       Cord pH:  7.188    Episiotomy: None   Laceration(s): None     Estimated Blood Loss (ml): No data [id number]    Labor Events  Method: None      Augmentation:    Cervical Ripening:     None        Operative Vaginal Delivery - none

## 2022-02-23 NOTE — PROGRESS NOTES
Post-Operative  Day 1    [facility]     Assessment: Post-Op day 1 s/p 1LTCS for SSA, stable    Plan:     - Routine post-operative care. - GHTN rare mild range postpartum.   - COVID +, contact precautions  - baby not cleared for circ by peds, to be done outpatient. - Postop hemoglobin stable. Plan to start Fe at discharge if pt anemic.  - Ambulate today. Information for the patient's :  Nevin Bradley [[id number]]   , Low Transverse     Patient doing well without significant complaint. Tolerating diet. Vinson out. Ambulating. Vitals:  Visit Vitals  /69 (BP 1 Location: Left arm, BP Patient Position: At rest)   Pulse 74   Temp 98.5 °F (36.9 °C)   Resp 16   Ht 5' 6\" (1.676 m)   Wt 119.7 kg (264 lb)   SpO2 99%   Breastfeeding Unknown   BMI 42.61 kg/m²     Temp (24hrs), Av.3 °F (36.8 °C), Min:98 °F (36.7 °C), Max:98.6 °F (37 °C)      Last 24hr Input/Output:    Intake/Output Summary (Last 24 hours) at 2022 1111  Last data filed at 2022 0315  Gross per 24 hour   Intake 530 ml   Output 995 ml   Net -465 ml          Exam:     Patient without distress. Fundus firm, nontender per nursing fundal checks. Incision bandaged, clean, dry, intact. Perineum with normal lochia noted per nursing assessment. Lower extremities are negative for pathological edema.     Labs:   Lab Results   Component Value Date/Time    WBC 17.4 (H) 2022 12:23 AM    WBC 12.3 (H) 2022 10:31 PM    HGB 7.9 (L) 2022 12:23 AM    HGB 11.3 (L) 2022 10:31 PM    HCT 24.3 (L) 2022 12:23 AM    HCT 34.5 (L) 2022 10:31 PM    PLATELET 062  12:23 AM    PLATELET 304 10//1464 10:31 PM       Recent Results (from the past 24 hour(s))   CBC WITH AUTOMATED DIFF    Collection Time: 22 12:23 AM   Result Value Ref Range    WBC 17.4 (H) 3.6 - 11.0 K/uL    RBC 2.78 (L) 3.80 - 5.20 M/uL    HGB 7.9 (L) 11.5 - 16.0 g/dL HCT 24.3 (L) 35.0 - 47.0 %    MCV 87.4 80.0 - 99.0 FL    MCH 28.4 26.0 - 34.0 PG    MCHC 32.5 30.0 - 36.5 g/dL    RDW 14.1 11.5 - 14.5 %    PLATELET 012 318 - 576 K/uL    MPV 11.1 8.9 - 12.9 FL    NRBC 0.0 0  WBC    ABSOLUTE NRBC 0.00 0.00 - 0.01 K/uL    NEUTROPHILS 85 (H) 32 - 75 %    LYMPHOCYTES 10 (L) 12 - 49 %    MONOCYTES 4 (L) 5 - 13 %    EOSINOPHILS 0 0 - 7 %    BASOPHILS 0 0 - 1 %    IMMATURE GRANULOCYTES 1 (H) 0.0 - 0.5 %    ABS. NEUTROPHILS 14.8 (H) 1.8 - 8.0 K/UL    ABS. LYMPHOCYTES 1.8 0.8 - 3.5 K/UL    ABS. MONOCYTES 0.7 0.0 - 1.0 K/UL    ABS. EOSINOPHILS 0.0 0.0 - 0.4 K/UL    ABS. BASOPHILS 0.0 0.0 - 0.1 K/UL    ABS. IMM.  GRANS. 0.1 (H) 0.00 - 0.04 K/UL    DF AUTOMATED

## 2022-02-23 NOTE — ROUTINE PROCESS
Bedside and Verbal shift change report given to REZA Staples RN (oncoming nurse) by Cortez Warren RN (offgoing nurse). Report included the following information SBAR.   1300- pt voided x2. Due to void and check void complete.

## 2022-02-23 NOTE — ROUTINE PROCESS
Bedside shift change report given to ROMI Colin RN (oncoming nurse) by Lauren Lopez. Brianda Silverman (offgoing nurse). Report included the following information SBAR.

## 2022-02-24 VITALS
TEMPERATURE: 98.1 F | OXYGEN SATURATION: 98 % | SYSTOLIC BLOOD PRESSURE: 146 MMHG | HEART RATE: 86 BPM | WEIGHT: 264 LBS | RESPIRATION RATE: 16 BRPM | HEIGHT: 66 IN | DIASTOLIC BLOOD PRESSURE: 93 MMHG | BODY MASS INDEX: 42.43 KG/M2

## 2022-02-24 PROCEDURE — 74011250637 HC RX REV CODE- 250/637: Performed by: OBSTETRICS & GYNECOLOGY

## 2022-02-24 RX ADMIN — IBUPROFEN 800 MG: 400 TABLET, FILM COATED ORAL at 12:43

## 2022-02-24 RX ADMIN — IBUPROFEN 800 MG: 400 TABLET, FILM COATED ORAL at 03:09

## 2022-02-24 RX ADMIN — ACETAMINOPHEN 650 MG: 325 TABLET ORAL at 03:09

## 2022-02-24 RX ADMIN — ACETAMINOPHEN 650 MG: 325 TABLET ORAL at 09:48

## 2022-02-24 NOTE — DISCHARGE SUMMARY
Obstetrical Discharge Summary     Name: Musa Tracy MRN: [id number]  SSN: [id number]    YOB: 1990  Age: 34 y.o. Sex: female      Admit Date: 2022    Discharge Date: 2022     Admitting Physician: Franky Felton MD     Attending Physician:  Rory De Souza MD     Admission Diagnoses: 39 weeks gestation of pregnancy [Z3A.39]    Discharge Diagnoses:   Information for the patient's :  Tamia Villatoro [[id number]]   Delivery of a 3.827 kg male infant via , Low Transverse on 2022 at 1:01 PM  by Funmilayo Cain. Apgars were 1  and 8 . Additional Diagnoses:   Hospital Problems  Never Reviewed          Codes Class Noted POA    39 weeks gestation of pregnancy ICD-10-CM: Z3A.39  ICD-9-CM: V22.2  2022 Unknown             Lab Results   Component Value Date/Time    Rubella, External 8.3 2021 12:00 AM    GrBStrep, External positive 2022 12:00 AM       Hospital Course: Normal hospital course following the delivery. Patient Instructions:   Current Discharge Medication List      START taking these medications    Details   ibuprofen (MOTRIN) 800 mg tablet Take 1 Tablet by mouth every eight (8) hours as needed for Pain. Qty: 40 Tablet, Refills: 1      oxyCODONE IR (Roxicodone) 5 mg immediate release tablet Take 1 Tablet by mouth every six (6) hours as needed for Pain for up to 5 days. Max Daily Amount: 20 mg.  Qty: 18 Tablet, Refills: 0    Associated Diagnoses: S/P  section         CONTINUE these medications which have NOT CHANGED    Details   PNV Comb #2-Iron-FA-Omega 3 29-1-400 mg cmpk Take 1 Tablet by mouth daily. Disposition at Discharge: Home or self care    Condition at Discharge: Stable    Reference my discharge instructions.     Follow-up Appointments   Procedures    FOLLOW UP VISIT Appointment in: 6 Weeks     Standing Status:   Standing     Number of Occurrences:   1     Order Specific Question:   Appointment in     Answer: 6 Weeks        Signed By:  Radha Schwartz MD     February 23, 2022

## 2022-02-24 NOTE — PROGRESS NOTES
Bedside shift change report given to Miguelina Castro RN (oncoming nurse) by Cassius Jiménez. PAVEL Lam (offgoing nurse). Report included the following information SBAR.     1:43 PM  Signed copy of discharge instructions on paper chart.

## 2022-02-24 NOTE — DISCHARGE INSTRUCTIONS
Postpartum Support Groups (virtual)  We know that all of us are dealing with a tremendous amount of uncertainty, confusion and disruption to our daily lives, which may result in increased anxiety, depression and fear. If you are feeling unsettled or worse, please know that we are here to help. During this time of increased caution and care for one another, [facility] [place] ([address] [place] [address]) is offering virtual support groups to ALL MOTHERS in [place] regardless of the age of your child/children as a way to help weather this emotional storm together. Social support is an important part of self-care during this time of physical distancing. Virtual postpartum support group meetings available at www. postpartumva.org  Warm Line: 151.984.2282    Breastfeeding Support Groups (virtual)   and  of each month   and  of each month    [place] at www.[employer] [place] [employer] under the \"About Us\" and \"Classes and Events tabs\"     Section: What to Expect at [address] [place] [address]     A  section, or , is surgery to deliver your baby through a cut that the doctor makes in your lower belly and uterus. The cut is called an incision. You may have some pain in your lower belly and need pain medicine for 1 to 2 weeks. You can expect some vaginal bleeding for several weeks. You will probably need about 6 weeks to fully recover. It's important to take it easy while the incision heals. Avoid heavy lifting, strenuous activities, and exercises that strain the belly muscles while you recover. Ask a family member or friend for help with housework, cooking, and shopping. This care sheet gives you a general idea about how long it will take for you to recover. But each person recovers at a different pace. Follow the steps below to get better as quickly as possible. How can you care for yourself at home? Activity    · Rest when you feel tired.  Getting enough sleep will help you recover.     · Try to walk each day. Start by walking a little more than you did the day before. Bit by bit, increase the amount you walk. Walking boosts blood flow and helps prevent pneumonia, constipation, and blood clots.     · Avoid strenuous activities, such as bicycle riding, jogging, weightlifting, and aerobic exercise, for 6 weeks or until your doctor says it is okay.     · Until your doctor says it is okay, do not lift anything heavier than your baby.     · Do not do sit-ups or other exercises that strain the belly muscles for 6 weeks or until your doctor says it is okay.     · Hold a pillow over your incision when you cough or take deep breaths. This will support your belly and decrease your pain.     · You may shower as usual. Pat the incision dry when you are done.     · You will have some vaginal bleeding. Wear sanitary pads. Do not douche or use tampons until your doctor says it is okay.     · Ask your doctor when you can drive again.     · You will probably need to take at least 6 weeks off work. It depends on the type of work you do and how you feel.     · Ask your doctor when it is okay for you to have sex. Diet    · You can eat your normal diet. If your stomach is upset, try bland, low-fat foods like plain rice, broiled chicken, toast, and yogurt.     · Drink plenty of fluids (unless your doctor tells you not to).     · You may notice that your bowel movements are not regular right after your surgery. This is common. Try to avoid constipation and straining with bowel movements. You may want to take a fiber supplement every day. If you have not had a bowel movement after a couple of days, ask your doctor about taking a mild laxative.     · If you are breastfeeding, limit alcohol. Alcohol can cause a lack of energy and other health problems for the baby when a breastfeeding woman drinks heavily. It can also get in the way of a mom's ability to feed her baby or to care for the child in other ways.  There isn't a lot of research about exactly how much alcohol can harm a baby. Having no alcohol is the safest choice for your baby. If you choose to have a drink now and then, have only one drink, and limit the number of occasions that you have a drink. Wait to breastfeed at least 2 hours after you have a drink to reduce the amount of alcohol the baby may get in the milk. Medicines    · Your doctor will tell you if and when you can restart your medicines. He or she will also give you instructions about taking any new medicines.     · If you take aspirin or some other blood thinner, ask your doctor if and when to start taking it again. Make sure that you understand exactly what your doctor wants you to do.     · Take pain medicines exactly as directed. ? If the doctor gave you a prescription medicine for pain, take it as prescribed. ? If you are not taking a prescription pain medicine, ask your doctor if you can take an over-the-counter medicine.     · If you think your pain medicine is making you sick to your stomach:  ? Take your medicine after meals (unless your doctor has told you not to). ? Ask your doctor for a different pain medicine.     · If your doctor prescribed antibiotics, take them as directed. Do not stop taking them just because you feel better. You need to take the full course of antibiotics. Incision care    · If you have strips of tape on the incision, leave the tape on for a week or until it falls off.     · Wash the area daily with warm, soapy water, and pat it dry. Don't use hydrogen peroxide or alcohol, which can slow healing. You may cover the area with a gauze bandage if it weeps or rubs against clothing. Change the bandage every day.     · Keep the area clean and dry. Other instructions    · If you breastfeed your baby, you may be more comfortable while you are healing if you place the baby so that he or she is not resting on your belly.  Try tucking your baby under your arm, with his or her body along the side you will be feeding on. Support your baby's upper body with your arm. With that hand you can control your baby's head to bring his or her mouth to your breast. This is sometimes called the football hold. Follow-up care is a key part of your treatment and safety. Be sure to make and go to all appointments, and call your doctor if you are having problems. It's also a good idea to know your test results and keep a list of the medicines you take. When should you call for help? Call 911 anytime you think you may need emergency care. For example, call if:    · You have thoughts of harming yourself, your baby, or another person.     · You passed out (lost consciousness).     · You have chest pain, are short of breath, or cough up blood.     · You have a seizure. Call your doctor now or seek immediate medical care if:    · You have pain that does not get better after you take pain medicine.     · You have severe vaginal bleeding.     · You are dizzy or lightheaded, or you feel like you may faint.     · You have new or worse pain in your belly or pelvis.     · You have loose stitches, or your incision comes open.     · You have symptoms of infection, such as:  ? Increased pain, swelling, warmth, or redness. ? Red streaks leading from the incision. ? Pus draining from the incision. ? A fever.     · You have symptoms of a blood clot in your leg (called a deep vein thrombosis), such as:  ? Pain in your calf, back of the knee, thigh, or groin. ? Redness and swelling in your leg or groin.     · You have signs of preeclampsia, such as:  ? Sudden swelling of your face, hands, or feet. ? New vision problems (such as dimness, blurring, or seeing spots). ? A severe headache. Watch closely for changes in your health, and be sure to contact your doctor if:    · You do not get better as expected. Where can you learn more?   Go to http://www.gray.com/  Enter M806 in the search box to learn more about \" Section: What to Expect at Home. \"  Current as of: 2021               Content Version: 13.0  © 7092-4901 [employer], [employer]. Care instructions adapted under license by [employer] (which disclaims liability or warranty for this information).  If you have questions about a medical condition or this instruction, always ask your healthcare professional. [place] [address] any warranty or liability for your use of this information.

## 2022-02-24 NOTE — PROGRESS NOTES
Post-Operative  Day 2    [facility] [place]     Assessment: Post-Op day 2, doing well  COVID+ on admission  GHTN - nl to low mild BPs, no meds  Acute blood loss anemia - hgb 11.3->7.9, will start Fe supplement    Plan:   - Routine post-operative care. - Plan for discharge [id number] Jessy Sharma Discharge Date: Today. Information for the patient's :  Lashae Alexander [[id number]]   , Low Transverse     Patient doing well per nursing. Tolerating regular diet. Ambulating. Voiding without difficulty. Vitals:  Visit Vitals  BP (!) 146/93 (BP 1 Location: Left upper arm, BP Patient Position: At rest)   Pulse 86   Temp 98.1 °F (36.7 °C)   Resp 16   Ht 5' 6\" (1.676 m)   Wt 119.7 kg (264 lb)   SpO2 98%   Breastfeeding Unknown   BMI 42.61 kg/m²     Temp (24hrs), Av.2 °F (36.8 °C), Min:98.1 °F (36.7 °C), Max:98.4 °F (36.9 °C)        Exam:       Patient without distress. Fundus firm, nontender per nursing fundal checks. Incision bandaged. Clean, dry, intact. Perineum with normal lochia noted per nursing assessment. Lower extremities are negative for pathological edema. Labs:   Lab Results   Component Value Date/Time    WBC 17.4 (H) 2022 12:23 AM    WBC 12.3 (H) 2022 10:31 PM    HGB 7.9 (L) 2022 12:23 AM    HGB 11.3 (L) 2022 10:31 PM    HCT 24.3 (L) 2022 12:23 AM    HCT 34.5 (L) 2022 10:31 PM    PLATELET 080  12:23 AM    PLATELET 009  10:31 PM       No results found for this or any previous visit (from the past 24 hour(s)).

## 2022-03-18 PROBLEM — E66.01 SEVERE OBESITY (HCC): Status: ACTIVE | Noted: 2018-12-07

## (undated) DEVICE — SYR 10ML CTRL LR LCK NSAF LF --

## (undated) DEVICE — 1010 S-DRAPE TOWEL DRAPE 10/BX: Brand: STERI-DRAPE™

## (undated) DEVICE — STERILE POLYISOPRENE POWDER-FREE SURGICAL GLOVES WITH EMOLLIENT COATING: Brand: PROTEXIS

## (undated) DEVICE — DRAPE MICSCP W46XL120IN FOR ZEISS MD

## (undated) DEVICE — SUTURE VCRL SZ 2-0 L36IN ABSRB VLT L36MM CT-1 1/2 CIR J345H

## (undated) DEVICE — WOUND RETRACTOR AND PROTECTOR: Brand: ALEXIS WOUND PROTECTOR-RETRACTOR

## (undated) DEVICE — STERILE POLYISOPRENE POWDER-FREE SURGICAL GLOVES: Brand: PROTEXIS

## (undated) DEVICE — SOLUTION IRRIG 1000ML 0.9% SOD CHL USP POUR PLAS BTL

## (undated) DEVICE — KENDALL SCD EXPRESS SLEEVES, KNEE LENGTH, MEDIUM: Brand: KENDALL SCD

## (undated) DEVICE — SUTURE VCRL SZ 0 L36IN ABSRB VLT L40MM CT 1/2 CIR J358H

## (undated) DEVICE — CATH FOLEY 16F LUBRI-SIL IC --

## (undated) DEVICE — COVERALL PREM SMS 2XL KNIT --

## (undated) DEVICE — SUTURE MCRYL SZ 0 L36IN ABSRB VLT L48MM CTX 1/2 CIR Y398H

## (undated) DEVICE — LIGHT HANDLE: Brand: DEVON

## (undated) DEVICE — DRAPE FLD WRM W44XL66IN C6L FOR INTRATEMP SYS THERMABASIN

## (undated) DEVICE — BLADE ASSEMB CLP HAIR FINE --

## (undated) DEVICE — CODMAN® SURGICAL PATTIES 1/2" X 1/2" (1.27CM X 1.27CM): Brand: CODMAN®

## (undated) DEVICE — SURGIFOAM SPNG SZ 100

## (undated) DEVICE — MASTISOL ADHESIVE LIQ 2/3ML

## (undated) DEVICE — NEEDLE HYPO 25GA L1.5IN BLU POLYPR HUB S STL REG BVL STR

## (undated) DEVICE — 3000CC GUARDIAN II: Brand: GUARDIAN

## (undated) DEVICE — CRANIOTOMY DRAPE, STERILE: Brand: MEDLINE

## (undated) DEVICE — ROYALSILK SURGICAL GOWN, L: Brand: CONVERTORS

## (undated) DEVICE — SUTURE VCRL SZ 3-0 L18IN ABSRB UD PS-2 L19MM 3/8 CRV PRIM J497H

## (undated) DEVICE — INFECTION CONTROL KIT SYS

## (undated) DEVICE — ROCKER SWITCH PENCIL BLADE ELECTRODE, HOLSTER: Brand: EDGE

## (undated) DEVICE — BLADE TYMPLSTY W2.5MM 60DEG SHRP ALL ARND BVL DN

## (undated) DEVICE — REM POLYHESIVE ADULT PATIENT RETURN ELECTRODE: Brand: VALLEYLAB

## (undated) DEVICE — (D)PREP SKN CHLRAPRP APPL 26ML -- CONVERT TO ITEM 371833

## (undated) DEVICE — DRESSING SIL W4XL5IN ANTIBACT GELLING FBR CYTOFORM

## (undated) DEVICE — INSULATED BLADE ELECTRODE: Brand: EDGE

## (undated) DEVICE — ELECTRODE 8227410 PAIRED 2 CH SET ROHS

## (undated) DEVICE — SPONGE: LAP 18X18 W  200/CS: Brand: MEDICAL ACTION INDUSTRIES

## (undated) DEVICE — SOLUTION IV 1000ML 0.9% SOD CHL

## (undated) DEVICE — SYR IRR BLB 2OZ DISP BLU STRL -- CONVERT TO ITEM 357637

## (undated) DEVICE — APPLICATOR BNDG 1MM ADH PREMIERPRO EXOFIN

## (undated) DEVICE — DRESSING EAR AD 5.5IN GLSCOCK

## (undated) DEVICE — TUBING SUCT MASTOID TWO IRRIG SPIK ALL IN 1 LTWT FLX LEVIN

## (undated) DEVICE — COVER,MAYO STAND,STERILE: Brand: MEDLINE

## (undated) DEVICE — SURGIFOAM SPNG SZ 12-7

## (undated) DEVICE — SURGICAL PROCEDURE PACK BASIN MAJ SET CUST NO CAUT

## (undated) DEVICE — SYR LR LCK 1ML GRAD NSAF 30ML --

## (undated) DEVICE — PACK,EENT,TURBAN DRAPE,PK II: Brand: MEDLINE

## (undated) DEVICE — DEVON™ KNEE AND BODY STRAP 60" X 3" (1.5 M X 7.6 CM): Brand: DEVON

## (undated) DEVICE — SOLUTION IRRIG 1000ML STRL H2O USP PLAS POUR BTL

## (undated) DEVICE — BIPOLAR FORCEPS CORD: Brand: VALLEYLAB

## (undated) DEVICE — SOLUTION IRRIG 1000ML H2O STRL BLT

## (undated) DEVICE — 4.0MM COARSE DIAMOND

## (undated) DEVICE — GOWN,SIRUS,NONRNF,SETINSLV,XL,20/CS: Brand: MEDLINE

## (undated) DEVICE — WIPE 400300 MEROCEL 20PK INSTRUMENT: Brand: MEROCEL®

## (undated) DEVICE — Device: Brand: PORTEX

## (undated) DEVICE — POWDER HEMOSTAT GEL 3.0GR -- SURGICEL

## (undated) DEVICE — 40418 TRENDELENBURG ONE-STEP ARM PROTECTORS LARGE (1 PAIR): Brand: 40418 TRENDELENBURG ONE-STEP ARM PROTECTORS LARGE (1 PAIR)

## (undated) DEVICE — TRAY PREP DRY W/ PREM GLV 2 APPL 6 SPNG 2 UNDPD 1 OVERWRAP

## (undated) DEVICE — PACK PROCEDURE SURG C SECT KT SMH

## (undated) DEVICE — BLADE OPHTH MINI BEAV SHRP --

## (undated) DEVICE — SOLIDIFIER MEDC 1200ML -- CONVERT TO 356117

## (undated) DEVICE — HANDLE LT SNAP ON ULT DURABLE LENS FOR TRUMPF ALC DISPOSABLE